# Patient Record
Sex: FEMALE | Employment: FULL TIME | URBAN - METROPOLITAN AREA
[De-identification: names, ages, dates, MRNs, and addresses within clinical notes are randomized per-mention and may not be internally consistent; named-entity substitution may affect disease eponyms.]

---

## 2023-03-11 ENCOUNTER — HOSPITAL ENCOUNTER (EMERGENCY)
Age: 18
Discharge: HOME | End: 2023-03-11
Attending: STUDENT IN AN ORGANIZED HEALTH CARE EDUCATION/TRAINING PROGRAM
Payer: COMMERCIAL

## 2023-03-11 VITALS
RESPIRATION RATE: 16 BRPM | HEIGHT: 63 IN | SYSTOLIC BLOOD PRESSURE: 133 MMHG | DIASTOLIC BLOOD PRESSURE: 78 MMHG | OXYGEN SATURATION: 99 % | TEMPERATURE: 98.7 F | BODY MASS INDEX: 22.86 KG/M2 | HEART RATE: 88 BPM | WEIGHT: 129 LBS

## 2023-03-11 DIAGNOSIS — R09.89 GLOBUS SENSATION: ICD-10-CM

## 2023-03-11 DIAGNOSIS — R59.0 LYMPHADENOPATHY OF RIGHT CERVICAL REGION: Primary | ICD-10-CM

## 2023-03-11 DIAGNOSIS — G93.31 POSTVIRAL FATIGUE SYNDROME: ICD-10-CM

## 2023-03-11 LAB
ANION GAP SERPL CALC-SCNC: 4 MMOL/L (ref 5–15)
BASOPHILS # BLD: 0.1 K/UL (ref 0–0.1)
BASOPHILS NFR BLD: 1 % (ref 0–1)
BUN SERPL-MCNC: 11 MG/DL (ref 6–20)
BUN/CREAT SERPL: 13 (ref 12–20)
CALCIUM SERPL-MCNC: 9.3 MG/DL (ref 8.5–10.1)
CHLORIDE SERPL-SCNC: 107 MMOL/L (ref 97–108)
CO2 SERPL-SCNC: 26 MMOL/L (ref 21–32)
CREAT SERPL-MCNC: 0.84 MG/DL (ref 0.55–1.02)
DIFFERENTIAL METHOD BLD: NORMAL
EOSINOPHIL # BLD: 0.2 K/UL (ref 0–0.4)
EOSINOPHIL NFR BLD: 4 % (ref 0–7)
ERYTHROCYTE [DISTWIDTH] IN BLOOD BY AUTOMATED COUNT: 11.9 % (ref 11.5–14.5)
GLUCOSE SERPL-MCNC: 113 MG/DL (ref 65–100)
HCT VFR BLD AUTO: 40.2 % (ref 35–47)
HETEROPH AB BLD QL IA: NEGATIVE
HGB BLD-MCNC: 13.8 G/DL (ref 11.5–16)
IMM GRANULOCYTES # BLD AUTO: 0 K/UL (ref 0–0.04)
IMM GRANULOCYTES NFR BLD AUTO: 0 % (ref 0–0.5)
LYMPHOCYTES # BLD: 1.5 K/UL (ref 0.8–3.5)
LYMPHOCYTES NFR BLD: 35 % (ref 12–49)
MCH RBC QN AUTO: 30.3 PG (ref 26–34)
MCHC RBC AUTO-ENTMCNC: 34.3 G/DL (ref 30–36.5)
MCV RBC AUTO: 88.4 FL (ref 80–99)
MONOCYTES # BLD: 0.2 K/UL (ref 0–1)
MONOCYTES NFR BLD: 6 % (ref 5–13)
NEUTS SEG # BLD: 2.3 K/UL (ref 1.8–8)
NEUTS SEG NFR BLD: 54 % (ref 32–75)
NRBC # BLD: 0 K/UL (ref 0–0.01)
NRBC BLD-RTO: 0 PER 100 WBC
PLATELET # BLD AUTO: 293 K/UL (ref 150–400)
PMV BLD AUTO: 9.5 FL (ref 8.9–12.9)
POTASSIUM SERPL-SCNC: 4 MMOL/L (ref 3.5–5.1)
RBC # BLD AUTO: 4.55 M/UL (ref 3.8–5.2)
SODIUM SERPL-SCNC: 137 MMOL/L (ref 136–145)
WBC # BLD AUTO: 4.2 K/UL (ref 3.6–11)

## 2023-03-11 PROCEDURE — 99283 EMERGENCY DEPT VISIT LOW MDM: CPT

## 2023-03-11 PROCEDURE — 85025 COMPLETE CBC W/AUTO DIFF WBC: CPT

## 2023-03-11 PROCEDURE — 86308 HETEROPHILE ANTIBODY SCREEN: CPT

## 2023-03-11 PROCEDURE — 36415 COLL VENOUS BLD VENIPUNCTURE: CPT

## 2023-03-11 PROCEDURE — 80048 BASIC METABOLIC PNL TOTAL CA: CPT

## 2023-03-11 NOTE — ED PROVIDER NOTES
Patient is a healthy 12-year-old female who a month ago patient had swelling on right neck that was diagnosed as a reactive lymph node. She was diagnosed with viral pharyngitis at that time. Since that time patient has been is very tired. On evaluation urgent care 3 to 4 weeks ago strep and Mono spot was negative. Patient denies throat, but has globus sensation. No fevers, chills or night sweats. Specifically no B symptoms. No past medical history on file. No past surgical history on file. No family history on file. Social History     Socioeconomic History    Marital status: SINGLE     Spouse name: Not on file    Number of children: Not on file    Years of education: Not on file    Highest education level: Not on file   Occupational History    Not on file   Tobacco Use    Smoking status: Not on file    Smokeless tobacco: Not on file   Substance and Sexual Activity    Alcohol use: Not on file    Drug use: Not on file    Sexual activity: Not on file   Other Topics Concern    Not on file   Social History Narrative    Not on file     Social Determinants of Health     Financial Resource Strain: Not on file   Food Insecurity: Not on file   Transportation Needs: Not on file   Physical Activity: Not on file   Stress: Not on file   Social Connections: Not on file   Intimate Partner Violence: Not on file   Housing Stability: Not on file         ALLERGIES: Patient has no allergy information on record. Review of Systems   Constitutional:  Positive for activity change and fatigue. Negative for appetite change and fever. HENT:  Positive for sore throat. Respiratory:  Negative for cough and shortness of breath. Cardiovascular:  Negative for chest pain. There were no vitals filed for this visit. Physical Exam  Vitals and nursing note reviewed. Constitutional:       General: She is not in acute distress. Appearance: Normal appearance.    HENT:      Head: Normocephalic and atraumatic. Right Ear: External ear normal.      Left Ear: External ear normal.      Nose: Nose normal.      Mouth/Throat:      Mouth: Mucous membranes are moist.      Pharynx: No oropharyngeal exudate or posterior oropharyngeal erythema. Eyes:      Extraocular Movements: Extraocular movements intact. Conjunctiva/sclera: Conjunctivae normal.   Neck:      Comments: Possible submandibular lymph node on the right, however no other lymphadenopathy in the neck, no erythema or fluctuance. Cardiovascular:      Rate and Rhythm: Normal rate. Pulses: Normal pulses. Radial pulses are 2+ on the right side and 2+ on the left side. Heart sounds: Normal heart sounds. Pulmonary:      Effort: Pulmonary effort is normal.      Breath sounds: Normal breath sounds. Abdominal:      General: Abdomen is flat. There is no distension. Tenderness: There is no abdominal tenderness. Musculoskeletal:         General: No deformity or signs of injury. Normal range of motion. Cervical back: Normal range of motion and neck supple. No tenderness. Skin:     General: Skin is warm and dry. Capillary Refill: Capillary refill takes less than 2 seconds. Neurological:      General: No focal deficit present. Mental Status: She is alert and oriented to person, place, and time. Psychiatric:         Attention and Perception: Attention normal.         Mood and Affect: Mood normal.         Behavior: Behavior normal.        Medical Decision Making  Amount and/or Complexity of Data Reviewed  Independent Historian: parent  Labs: ordered. Decision-making details documented in ED Course.       ED Course as of 03/12/23 1217   Sat Mar 11, 2023   1830 WBC: 4.2 [AL]   1830 HGB: 13.8 [AL]   1851 Mononucleosis screen: Negative [AL]      ED Course User Index  [AL] Eugenie Blair MD     LABORATORY RESULTS:  Labs Reviewed   METABOLIC PANEL, BASIC - Abnormal; Notable for the following components:       Result Value    Anion gap 4 (*)     Glucose 113 (*)     All other components within normal limits   CBC WITH AUTOMATED DIFF   MONONUCLEOSIS SCREEN       MEDICATIONS GIVEN:  Medications - No data to display    Differential diagnosis: Globus sensation, lymphadenopathy, mononucleosis, other viral process, postviral fatigue syndrome    ED physician interpretation of laboratory results: Lab work unremarkable. CBC within normal limits. Monospot negative. Mild hyperglycemia without DKA. MDM: Patient is a 20-year-old female presented ED with about 1 month of globus sensation and possible right reactive lymph node. Physical exam is unremarkable. Patient is in no acute distress without fevers or B symptoms. Patient is well-appearing. No indication for advanced imaging at this time. Further outpatient symptomatic management appropriate. Patient has follow-up appointment with PCP at the end of April. Additional information for PCP establishment provided. Further personalized recommendations for outpatient care as below. Key discharge instructions and summary of care: You presented to the ED with a swollen right lymph node/bump in your neck for about 3 to 4 weeks. Work-up here was unremarkable. Your CBC your complete blood count was completely normal.  Your Monospot was negative. Your basic metabolic panel had no concerning findings. You have concern for lymphoma after recent Internet research. Based on your current exam and lab work this is unlikely I recommend outpatient follow-up with PCP. If you do not have a primary care physician use information or discharge paperwork to establish primary care. If symptoms change or worsen you start having night fevers, night sweats and worsening swelling to neck return to ED for further evaluation. The patient has been re-evaluated and feeling better. Patient is stable for discharge.   All available radiology and laboratory results have been reviewed with patient and/or available family. Patient and/or family verbally conveyed their understanding and agreement of the patient's signs, symptoms, diagnosis, treatment and prognosis and additionally agree to follow-up as recommended in the discharge instructions or to return to the Emergency Department should their condition change or worsen prior to their follow-up appointment. All questions have been answered and patient and/or available family express understanding. IMPRESSION:  1. Lymphadenopathy of right cervical region    2. Postviral fatigue syndrome    3. Globus sensation        DISPOSITION: Discharged    Juanpablo Banda MD      Procedures

## 2023-03-11 NOTE — ED TRIAGE NOTES
Pt reports she noticed right lymph swollen one month ago. Was evaluated and dx with viral tonsillitis. Denies sore throat. Reports increased general malaise.

## 2023-03-11 NOTE — DISCHARGE INSTRUCTIONS
You presented to the ED with a swollen right lymph node/bump in your neck for about 3 to 4 weeks. Work-up here was unremarkable. Your CBC your complete blood count was completely normal.  Your Monospot was negative. Your basic metabolic panel had no concerning findings. You have concern for lymphoma after recent Internet research. Based on your current exam and lab work this is unlikely I recommend outpatient follow-up with PCP. If you do not have a primary care physician use information or discharge paperwork to establish primary care. If symptoms change or worsen you start having night fevers, night sweats and worsening swelling to neck return to ED for further evaluation.

## 2023-04-07 ENCOUNTER — OFFICE VISIT (OUTPATIENT)
Dept: ENT CLINIC | Age: 18
End: 2023-04-07

## 2023-04-14 ENCOUNTER — HOSPITAL ENCOUNTER (OUTPATIENT)
Dept: ULTRASOUND IMAGING | Age: 18
Discharge: HOME | End: 2023-04-14
Attending: OTOLARYNGOLOGY
Payer: COMMERCIAL

## 2023-04-14 DIAGNOSIS — R59.1 LYMPHADENOPATHY: ICD-10-CM

## 2023-04-14 PROCEDURE — 76536 US EXAM OF HEAD AND NECK: CPT

## 2023-04-18 ENCOUNTER — TELEPHONE (OUTPATIENT)
Dept: ENT CLINIC | Age: 18
End: 2023-04-18

## 2023-04-18 NOTE — TELEPHONE ENCOUNTER
Called and spoke with pt re: Yonathan Frausto results    Will plan follow up in 2-3 months -  recheck neck     MD Selene Apple [address] [facility]  [facility] [address] [facility]  Office Phone: [phone]

## 2023-04-27 ENCOUNTER — VIRTUAL VISIT (OUTPATIENT)
Dept: PRIMARY CARE CLINIC | Age: 18
End: 2023-04-27
Payer: COMMERCIAL

## 2023-04-27 DIAGNOSIS — R59.9 ENLARGED LYMPH NODES: ICD-10-CM

## 2023-04-27 DIAGNOSIS — Z76.89 ENCOUNTER TO ESTABLISH CARE: Primary | ICD-10-CM

## 2023-04-27 PROCEDURE — 99213 OFFICE O/P EST LOW 20 MIN: CPT | Performed by: NURSE PRACTITIONER

## 2023-04-27 NOTE — PROGRESS NOTES
Visit Vitals  [facility] 04/16/2023     Chief Complaint   Patient presents with    Establish Care       1. \"Have you been to the ER, urgent care clinic since your last visit? Hospitalized since your last visit? \" No    2. \"Have you seen or consulted any other health care providers outside of the [address] [place] [address] since your last visit? \" No     3. For patients aged 39-70: Has the patient had a colonoscopy / FIT/ Cologuard? NA - based on age      If the patient is female:    4. For patients aged 41-77: Has the patient had a mammogram within the past 2 years? NA - based on age or sex      11. For patients aged 21-65: Has the patient had a pap smear?  NA - based on age or sex

## 2023-04-27 NOTE — PROGRESS NOTES
HISTORY OF PRESENT ILLNESS  Mona Cruz is a 21 y.o. female presents via telemedicine to establish care. Patient states in Feb she had swollen lymph nodes in her neck, [facility] normal. Symptoms persisted so she went to ER and was referred to ENT. ENT did an ultrasound and she was told the lymph nodes were benign. Patient states since then, the lymph nodes in her right groin started to swell up x 2 weeks. She denies fevers. She does have pain when she palpates them and pain in her leg. She denies concern for STDs, states she is not sexually active. She denies recent illness. No night sweats or weight loss. Denies any other symptoms. There were no vitals filed for this visit. There is no problem list on file for this patient. There are no problems to display for this patient. Current Outpatient Medications   Medication Sig Dispense Refill    Junel FE 1.5/30, 28, 1.5 mg-30 mcg (21)/75 mg (7) tab Take 1 Tablet by mouth daily. Allergies   Allergen Reactions    Azithromycin Rash     Past Medical History:   Diagnosis Date    Contact dermatitis and eczema due to cause      History reviewed. No pertinent surgical history. Family History   Problem Relation Age of Onset    Migraines Mother     Cancer Maternal Grandmother     Cancer Maternal Grandfather     Cancer Maternal Aunt     Cancer Maternal Aunt      Social History     Tobacco Use    Smoking status: Never    Smokeless tobacco: Never   Substance Use Topics    Alcohol use: Never           Review of Systems   Constitutional: Negative. Respiratory: Negative. Cardiovascular: Negative. Genitourinary: Negative. Musculoskeletal: Negative. Neurological: Negative. Physical Exam  Constitutional:       General: She is not in acute distress. Appearance: Normal appearance. HENT:      Head: Normocephalic.    Pulmonary:      Effort: Pulmonary effort is normal.   Neurological:      Mental Status: She is alert and oriented to person, place, and time. Mental status is at baseline. Psychiatric:         Mood and Affect: Mood normal.         Behavior: Behavior normal.         ASSESSMENT and PLAN  Diagnoses and all orders for this visit:    1. Encounter to establish care    2. Enlarged lymph nodes  Comments:  to neck and now right groin, no associated symptoms. Will check labs and consider ultrasound pending lab results. Orders:  -     CBC WITH AUTOMATED DIFF         Sonali Moyer, who was evaluated through a synchronous (real-time) audio-video encounter, and/or her healthcare decision maker, is aware that it is a billable service, with coverage as determined by her insurance carrier. She provided verbal consent to proceed: Yes, and patient identification was verified. It was conducted pursuant to the emergency declaration under the [address] [place] [address] and the [employer] Act. A caregiver was present when appropriate. Ability to conduct physical exam was limited. I was at home. The patient was at home. Sonali Moyer is a 21 y.o. female being evaluated by a Virtual Visit (video visit) encounter to address concerns as mentioned above. A caregiver was present when appropriate. Due to this being a TeleHealth encounter (During YRIS-21 public health emergency), evaluation of the following organ systems was limited: Vitals/Constitutional/EENT/Resp/CV/GI//MS/Neuro/Skin/Heme-Lymph-Imm. Pursuant to the emergency declaration under the [address] [place] [address], [address] [place] [address] and the [employer] and [employer] Act, this Virtual Visit was conducted with patient's (and/or legal guardian's) consent, to reduce the risk of exposure to COVID-19 and provide necessary medical care.       Services were provided through a video synchronous discussion virtually to substitute for in-person encounter. --BENTON Contreras on 4/27/2023 at 10:34 AM    An electronic signature was used to authenticate this note.

## 2023-04-29 LAB
BASOPHILS # BLD AUTO: 0.1 X10E3/UL (ref 0–0.2)
BASOPHILS NFR BLD AUTO: 1 %
EOSINOPHIL # BLD AUTO: 0.2 X10E3/UL (ref 0–0.4)
EOSINOPHIL NFR BLD AUTO: 3 %
ERYTHROCYTE [DISTWIDTH] IN BLOOD BY AUTOMATED COUNT: 12 % (ref 11.7–15.4)
HCT VFR BLD AUTO: 40.5 % (ref 34–46.6)
HGB BLD-MCNC: 13.7 G/DL (ref 11.1–15.9)
IMM GRANULOCYTES # BLD AUTO: 0 X10E3/UL (ref 0–0.1)
IMM GRANULOCYTES NFR BLD AUTO: 0 %
LYMPHOCYTES # BLD AUTO: 2.4 X10E3/UL (ref 0.7–3.1)
LYMPHOCYTES NFR BLD AUTO: 39 %
MCH RBC QN AUTO: 30.2 PG (ref 26.6–33)
MCHC RBC AUTO-ENTMCNC: 33.8 G/DL (ref 31.5–35.7)
MCV RBC AUTO: 89 FL (ref 79–97)
MONOCYTES # BLD AUTO: 0.5 X10E3/UL (ref 0.1–0.9)
MONOCYTES NFR BLD AUTO: 7 %
NEUTROPHILS # BLD AUTO: 3.1 X10E3/UL (ref 1.4–7)
NEUTROPHILS NFR BLD AUTO: 50 %
PLATELET # BLD AUTO: 298 X10E3/UL (ref 150–450)
RBC # BLD AUTO: 4.54 X10E6/UL (ref 3.77–5.28)
WBC # BLD AUTO: 6.2 X10E3/UL (ref 3.4–10.8)

## 2023-06-19 ENCOUNTER — TELEPHONE (OUTPATIENT)
Age: 18
End: 2023-06-19

## 2023-06-19 NOTE — TELEPHONE ENCOUNTER
Attempted to reach pt to inform her that Dr. Nicholas Addison had to add an emergency surgery on Friday and that it will run into her current appointment time with Dr. Nicholas Addison and that we need to reschedule her appt to a later time the same day or a different day all together. Unable to leave voicemail due to the b being full.

## 2023-06-21 ENCOUNTER — TELEPHONE (OUTPATIENT)
Age: 18
End: 2023-06-21

## 2023-06-23 ENCOUNTER — HOSPITAL ENCOUNTER (OUTPATIENT)
Dept: VASCULAR SURGERY | Facility: HOSPITAL | Age: 18
Discharge: HOME | End: 2023-06-23
Payer: COMMERCIAL

## 2023-06-23 DIAGNOSIS — R59.9 ENLARGED LYMPH NODES, UNSPECIFIED: ICD-10-CM

## 2023-06-23 PROCEDURE — 93971 EXTREMITY STUDY: CPT

## 2023-06-30 ENCOUNTER — OFFICE VISIT (OUTPATIENT)
Age: 18
End: 2023-06-30
Payer: COMMERCIAL

## 2023-06-30 VITALS
BODY MASS INDEX: 23.39 KG/M2 | HEIGHT: 63 IN | DIASTOLIC BLOOD PRESSURE: 84 MMHG | OXYGEN SATURATION: 99 % | SYSTOLIC BLOOD PRESSURE: 136 MMHG | WEIGHT: 132 LBS | HEART RATE: 107 BPM

## 2023-06-30 DIAGNOSIS — R59.0 LOCALIZED ENLARGED LYMPH NODES: Primary | ICD-10-CM

## 2023-06-30 DIAGNOSIS — J02.9 SORE THROAT: ICD-10-CM

## 2023-06-30 PROCEDURE — 99213 OFFICE O/P EST LOW 20 MIN: CPT | Performed by: OTOLARYNGOLOGY

## 2023-06-30 RX ORDER — AMOXICILLIN AND CLAVULANATE POTASSIUM 875; 125 MG/1; MG/1
1 TABLET, FILM COATED ORAL 2 TIMES DAILY
Qty: 20 TABLET | Refills: 0 | Status: SHIPPED | OUTPATIENT
Start: 2023-06-30 | End: 2023-07-10

## 2023-06-30 RX ORDER — NORETHINDRONE ACETATE AND ETHINYL ESTRADIOL AND FERROUS FUMARATE 1.5-30(21)
1 KIT ORAL DAILY
COMMUNITY
Start: 2023-04-30

## 2023-07-18 DIAGNOSIS — R59.0 LOCALIZED ENLARGED LYMPH NODES: Primary | ICD-10-CM

## 2023-07-31 ENCOUNTER — HOSPITAL ENCOUNTER (OUTPATIENT)
Facility: HOSPITAL | Age: 18
Discharge: HOME | End: 2023-08-03
Attending: OTOLARYNGOLOGY
Payer: COMMERCIAL

## 2023-07-31 DIAGNOSIS — R59.0 LOCALIZED ENLARGED LYMPH NODES: ICD-10-CM

## 2023-07-31 PROCEDURE — 76536 US EXAM OF HEAD AND NECK: CPT

## 2023-10-20 ENCOUNTER — OFFICE VISIT (OUTPATIENT)
Age: 18
End: 2023-10-20
Payer: COMMERCIAL

## 2023-10-20 VITALS
WEIGHT: 132 LBS | BODY MASS INDEX: 23.39 KG/M2 | RESPIRATION RATE: 18 BRPM | OXYGEN SATURATION: 98 % | HEIGHT: 63 IN | DIASTOLIC BLOOD PRESSURE: 58 MMHG | HEART RATE: 97 BPM | SYSTOLIC BLOOD PRESSURE: 116 MMHG

## 2023-10-20 DIAGNOSIS — R59.0 LOCALIZED ENLARGED LYMPH NODES: Primary | ICD-10-CM

## 2023-10-20 DIAGNOSIS — J35.8 TONSIL STONE: ICD-10-CM

## 2023-10-20 DIAGNOSIS — J30.9 ALLERGIC RHINITIS, UNSPECIFIED SEASONALITY, UNSPECIFIED TRIGGER: ICD-10-CM

## 2023-10-20 PROCEDURE — 99213 OFFICE O/P EST LOW 20 MIN: CPT | Performed by: OTOLARYNGOLOGY

## 2023-10-20 RX ORDER — CETIRIZINE HYDROCHLORIDE 10 MG/1
10 TABLET ORAL DAILY
Qty: 90 TABLET | Refills: 1 | Status: SHIPPED | OUTPATIENT
Start: 2023-10-20

## 2023-10-20 RX ORDER — FLUTICASONE PROPIONATE 50 MCG
1 SPRAY, SUSPENSION (ML) NASAL DAILY
Qty: 16 G | Refills: 2 | Status: SHIPPED | OUTPATIENT
Start: 2023-10-20

## 2023-10-20 NOTE — PROGRESS NOTES
Otolaryngology-Head and Neck Surgery  Follow Up Patient Visit     Patient: Pam Jean  YOB: 2005  MRN: [id number]  Date of Service: 10/20/2023      Chief complaint:   No chief complaint on file. Interval hx: 10/20/2023  Doing well  Increased tonsil stones which are bothersome   Feels hard to clear on the right     Interval hx 6/2023  No change overall  Still feels nodes are swollen but no worse  Still feels R tonsil larger     History of Present Illness: Pam Jean is a 21y.o. year old female who presents today for discussion of neck and throat concerns    Over the last couple of months has developed neck adenopathy, R sided and mild sore throat  Was seen at urgent care and dx with viral pharyngitis  With continued symptoms, seen in ER - had blood work etc which was benign     Overall symptoms continue  She feels her right tonsil may be more swollen    Hx of tonsillitis as a child, no issues as adult    Otherwise healthy     Past Medical History:  No past medical history on file. Past Surgical History:   No past surgical history on file. Medications:   No current outpatient medications    Allergies:   No Known Allergies    Social History:         Family History:  No family history on file. Review of Systems:    Consitutional: denies fever, excessive weight gain or loss. Eyes: denies diplopia, eye pain. Integumentary: denies new concerning skin lesions. Ears, Nose, Mouth, Throat: denies except as per HPI. Endocrine: denies hot or cold intolerance, increased thirst.  Respiratory: denies cough, hemoptysis, wheezing  Gastrointestinal: denies trouble swallowing, nausea, emesis, regurgitation  Musculoskeletal: denies muscle weakness or wasting  Cardiovascular: denies chest pain, shortness of breath  Neurologic: denies seizures, numbness or tingling, syncope  Hematologic: denies easy bleeding or bruising    Physical Examination:   There were no vitals filed for this visit.      General:

## 2024-01-10 RX ORDER — FLUTICASONE PROPIONATE 50 MCG
SPRAY, SUSPENSION (ML) NASAL
Qty: 1 EACH | Refills: 1 | Status: SHIPPED | OUTPATIENT
Start: 2024-01-10

## 2024-03-07 RX ORDER — FLUTICASONE PROPIONATE 50 MCG
SPRAY, SUSPENSION (ML) NASAL
Qty: 1 EACH | Refills: 1 | Status: SHIPPED | OUTPATIENT
Start: 2024-03-07

## 2024-03-08 RX ORDER — FLUTICASONE PROPIONATE 50 MCG
SPRAY, SUSPENSION (ML) NASAL
Refills: 1 | OUTPATIENT
Start: 2024-03-08

## 2024-04-23 RX ORDER — CETIRIZINE HYDROCHLORIDE 10 MG/1
10 TABLET ORAL DAILY
Qty: 90 TABLET | Refills: 1 | Status: SHIPPED | OUTPATIENT
Start: 2024-04-23

## 2024-08-21 ENCOUNTER — HOSPITAL ENCOUNTER (EMERGENCY)
Facility: HOSPITAL | Age: 19
Discharge: HOME | End: 2024-08-22
Attending: EMERGENCY MEDICINE
Payer: COMMERCIAL
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
## 2024-08-21 DIAGNOSIS — Z23 NEED FOR PROPHYLACTIC VACCINATION AND INOCULATION AGAINST RABIES: ICD-10-CM

## 2024-08-21 PROCEDURE — 99284 EMERGENCY DEPT VISIT MOD MDM: CPT

## 2024-08-22 VITALS
TEMPERATURE: 98.4 F | OXYGEN SATURATION: 100 % | SYSTOLIC BLOOD PRESSURE: 118 MMHG | RESPIRATION RATE: 17 BRPM | HEART RATE: 74 BPM | WEIGHT: 134 LBS | DIASTOLIC BLOOD PRESSURE: 74 MMHG | HEIGHT: 63 IN | BODY MASS INDEX: 23.74 KG/M2

## 2024-08-22 PROBLEM — Z20.3 RABIES EXPOSURE: Status: ACTIVE | Noted: 2024-08-22

## 2024-08-22 PROCEDURE — 90471 IMMUNIZATION ADMIN: CPT | Performed by: EMERGENCY MEDICINE

## 2024-08-22 PROCEDURE — 96372 THER/PROPH/DIAG INJ SC/IM: CPT

## 2024-08-22 PROCEDURE — 90375 RABIES IG IM/SC: CPT | Performed by: EMERGENCY MEDICINE

## 2024-08-22 PROCEDURE — 90472 IMMUNIZATION ADMIN EACH ADD: CPT | Performed by: EMERGENCY MEDICINE

## 2024-08-22 PROCEDURE — 90714 TD VACC NO PRESV 7 YRS+ IM: CPT | Performed by: EMERGENCY MEDICINE

## 2024-08-22 PROCEDURE — 6360000002 HC RX W HCPCS: Performed by: EMERGENCY MEDICINE

## 2024-08-22 PROCEDURE — 90675 RABIES VACCINE IM: CPT | Performed by: EMERGENCY MEDICINE

## 2024-08-22 RX ADMIN — CLOSTRIDIUM TETANI TOXOID ANTIGEN (FORMALDEHYDE INACTIVATED) AND CORYNEBACTERIUM DIPHTHERIAE TOXOID ANTIGEN (FORMALDEHYDE INACTIVATED) 0.5 ML: 5; 2 INJECTION, SUSPENSION INTRAMUSCULAR at 00:40

## 2024-08-22 RX ADMIN — RABIES IMMUNE GLOBULIN (HUMAN) 1215 UNITS: 300 INJECTION, SOLUTION INFILTRATION; INTRAMUSCULAR at 00:36

## 2024-08-22 RX ADMIN — RABIES VACCINE 1 ML: KIT at 00:36

## 2024-08-22 ASSESSMENT — PAIN - FUNCTIONAL ASSESSMENT: PAIN_FUNCTIONAL_ASSESSMENT: NONE - DENIES PAIN

## 2024-08-22 NOTE — ED TRIAGE NOTES
Pt ambulatory to triage with CC of a cat bite on her left thumb.     Pt came to get checked out due to the bite drawing blood.     This RN sees no swelling, redness or drainage.

## 2024-08-22 NOTE — CARE COORDINATION
1:37 PM  CM received a call back from the patient, provided appointment information- address/time/phone number.     9:45 AM    CM spoke to [facility] and they set up the first appointment on 8/25/24 at 9:00AM. CM added to the AVS and will provide date/time when patient returns the call.     8/22/24  9:36 AM    ED CASE MANAGEMENT NOTE:    CM noted rabies scripts as patient is in need of the rabies series. CM noted the first appointment will be on Saturday, CM faxed scripts to [facility] as they are the only infusion center open on the weekends. CM called patients charted phone number and left a HIPAA compliant VM-awaiting return call.    CM to follow up with [facility] once the fax is received.    NIDHI Murray  [facility]      Available via [employer]

## 2024-08-23 NOTE — ED PROVIDER NOTES
[facility] EMERGENCY DEPT  EMERGENCY DEPARTMENT ENCOUNTER      Pt Name: Chantelle Fernando  MRN: [id number]  Birthdate 2005  Date of evaluation: 8/21/2024  Provider: Julian Wallis MD    CHIEF COMPLAINT       Chief Complaint   Patient presents with    Animal Bite         HISTORY OF PRESENT ILLNESS   (Location/Symptom, Timing/Onset, Context/Setting, Quality, Duration, Modifying Factors, Severity)  Note limiting factors.   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  The patient deup-to-date.  Patient denies any further discomfort at this time.            Review of External Medical Records:     Nursing Notes were reviewed.    REVIEW OF SYSTEMS    (2-9 systems for level 4, 10 or more for level 5)     Review of Systems   All other systems reviewed and are negative.      Except as noted above the remainder of the review of systems was reviewed and negative.       PAST MEDICAL HISTORY     Past Medical History:   Diagnosis Date    Contact dermatitis and eczema due to cause          SURGICAL HISTORY     No past surgical history on file.      CURRENT MEDICATIONS       Discharge Medication List as of 8/22/2024 12:28 AM        CONTINUE these medications which have NOT CHANGED    Details   cetirizine (ZYRTEC) 10 MG tablet TAKE 1 TABLET BY MOUTH EVERY DAY, Disp-90 tablet, R-1Normal      fluticasone (FLONASE) 50 MCG/ACT nasal spray SPRAY 1 SPRAY INTO EACH NOSTRIL EVERY DAY, Disp-1 each, R-1Normal      JUNEL FE 1.5/30 1.5-30 MG-MCG tablet Take 1 tablet by mouth daily, DAWHistorical Med             ALLERGIES     Azithromycin    FAMILY HISTORY       Family History   Problem Relation Age of Onset    Cancer Maternal Aunt         Cancer    Migraines Mother     Cancer Maternal Grandmother     Cancer Maternal Grandfather     Cancer Maternal Aunt         Cancer          SOCIAL HISTORY       Social History     Socioeconomic History    Marital status:  outlined and questions have been answered. Pt is ready to go home. Will send home on cat bite of left thumb as well as rabies vaccination and. Outpatient referral with PCP as needed. Written by Julian Wallis MD,6:22 AM       CONSULTS:  None    PROCEDURES:  Unless otherwise noted below, none     Procedures      FINAL IMPRESSION      1. Cat bite, initial encounter    2. Need for prophylactic vaccination and inoculation against rabies          DISPOSITION/PLAN   DISPOSITION Decision To Discharge 08/22/2024 12:19:32 AM      PATIENT REFERRED TO:  [address] [address] [phone]  Go on 8/25/2024  at 9:00 AM for day 3 rabies shot, they will schedule day 7 and 14 with you when you go to your first appointment.      DISCHARGE MEDICATIONS:  Discharge Medication List as of 8/22/2024 12:28 AM        START taking these medications    Details   !! rabies vaccine, PCEC (RABAVERT) injection Inject 1 mL into the muscle See Admin Instructions To be administered by a pharmacist, health department or outpatient department in 7 days., Disp-1 each, R-0Print      !! rabies vaccine, PCEC (RABAVERT) injection Inject 1 mL into the muscle See Admin Instructions To be administered by a pharmacist, health department or outpatient department in 14 days., Disp-1 each, R-0Print      !! rabies vaccine, PCEC (RABAVERT) injection Inject 1 mL into the muscle See Admin Instructions To be administered by a pharmacist, health department or outpatient department in 3 days., Disp-1 each, R-0Print      amoxicillin-clavulanate (AUGMENTIN) 875-125 MG per tablet Take 1 tablet by mouth 2 times daily for 10 days, Disp-14 tablet, R-0Print       !! - Potential duplicate medications found. Please discuss with provider.            (Please note that portions of this note were completed with a voice recognition program.  Efforts were made to edit the dictations but occasionally words are mis-transcribed.)    Julian

## 2024-08-25 ENCOUNTER — HOSPITAL ENCOUNTER (OUTPATIENT)
Facility: HOSPITAL | Age: 19
Setting detail: INFUSION SERIES
Discharge: HOME | End: 2024-08-25
Payer: COMMERCIAL

## 2024-08-25 VITALS
DIASTOLIC BLOOD PRESSURE: 70 MMHG | TEMPERATURE: 98.8 F | RESPIRATION RATE: 16 BRPM | HEART RATE: 100 BPM | OXYGEN SATURATION: 100 % | SYSTOLIC BLOOD PRESSURE: 129 MMHG

## 2024-08-25 DIAGNOSIS — Z20.3 RABIES EXPOSURE: Primary | ICD-10-CM

## 2024-08-25 PROCEDURE — 6360000002 HC RX W HCPCS: Performed by: EMERGENCY MEDICINE

## 2024-08-25 PROCEDURE — 90675 RABIES VACCINE IM: CPT | Performed by: EMERGENCY MEDICINE

## 2024-08-25 PROCEDURE — 90471 IMMUNIZATION ADMIN: CPT | Performed by: EMERGENCY MEDICINE

## 2024-08-25 RX ADMIN — RABIES VACCINE 1 ML: KIT at 08:49

## 2024-08-25 NOTE — PROGRESS NOTES
[facility]fusion [facility] Progress Note        Date: 24    Name: Chantelle Fernando    MRN: [id number]         : 2005    MD: Julian Wallis MD       Ms. Fernando admitted to [facility] for dose 2 rabies vaccine ambulatory in stable condition. Assessment completed. No new concerns voiced.     Injection given in R. Deltoid.      ** Patient has received this medication in the past. Patient reports no previous reactions to the medication(s).        Vitals:    24 0845   BP: 129/70   Pulse: 100   Resp: 16   Temp: 98.8 °F (37.1 °C)   TempSrc: Oral   SpO2: 100%           Medications:  MEDICATIONS GIVEN:  Medications Administered         rabies vaccine, PCEC (RABAVERT) injection 1 mL Admin Date  2024 Action  Given Dose  1 mL Route  IntraMUSCular Documented By  Julissa Elkins, RN                Pt tolerated treatment well. D/c home ambulatory in no distress. Patient is to follow up to reschedule their next appointment.

## 2024-08-29 ENCOUNTER — HOSPITAL ENCOUNTER (OUTPATIENT)
Facility: HOSPITAL | Age: 19
Setting detail: INFUSION SERIES
Discharge: HOME | End: 2024-08-29
Payer: COMMERCIAL

## 2024-08-29 VITALS
RESPIRATION RATE: 16 BRPM | HEART RATE: 99 BPM | DIASTOLIC BLOOD PRESSURE: 77 MMHG | OXYGEN SATURATION: 100 % | SYSTOLIC BLOOD PRESSURE: 121 MMHG | TEMPERATURE: 99 F

## 2024-08-29 DIAGNOSIS — Z20.3 RABIES EXPOSURE: Primary | ICD-10-CM

## 2024-08-29 PROCEDURE — 6360000002 HC RX W HCPCS: Performed by: EMERGENCY MEDICINE

## 2024-08-29 PROCEDURE — 90471 IMMUNIZATION ADMIN: CPT | Performed by: EMERGENCY MEDICINE

## 2024-08-29 PROCEDURE — 90675 RABIES VACCINE IM: CPT | Performed by: EMERGENCY MEDICINE

## 2024-08-29 RX ADMIN — RABIES VACCINE 1 ML: KIT at 16:04

## 2024-08-29 ASSESSMENT — PAIN SCALES - GENERAL: PAINLEVEL_OUTOF10: 0

## 2024-08-29 NOTE — PROGRESS NOTES
[facility] Progress Note    Date: August 29, 2024    1600: Ms. Fernando Arrived ambulatory and in stable condition to the [facility] for  Rabies Vaccine.  Assessment was completed, no new complaints voiced. Criteria for treatment was met.    Ms. Fernando's vitals were reviewed.  Patient Vitals for the past 12 hrs:   Temp Pulse Resp BP SpO2   08/29/24 1600 99 °F (37.2 °C) 99 16 121/77 100 %     Medications:  Medications Administered         rabies vaccine, PCEC (RABAVERT) injection 1 mL Admin Date  08/29/2024 Action  Given Dose  1 mL Route  IntraMUSCular Documented By  Madelyn Snyder, WILL           Given in LANCE.    Patient tolerated treatment well. Patient was discharged in stable condition. Patient is aware of next scheduled [facility] appointment on September 5, 2024 at 1600.    Future Appointments   Date Time Provider Department Center   9/5/2024  4:00 [facility]     Madelyn Snyder RN  August 29, 2024

## 2024-08-30 ENCOUNTER — HOSPITAL ENCOUNTER (OUTPATIENT)
Facility: HOSPITAL | Age: 19
Setting detail: INFUSION SERIES
End: 2024-08-30
Payer: COMMERCIAL

## 2024-09-05 ENCOUNTER — HOSPITAL ENCOUNTER (OUTPATIENT)
Facility: HOSPITAL | Age: 19
Setting detail: INFUSION SERIES
Discharge: HOME | End: 2024-09-05
Payer: COMMERCIAL

## 2024-09-05 VITALS
SYSTOLIC BLOOD PRESSURE: 130 MMHG | HEART RATE: 114 BPM | RESPIRATION RATE: 16 BRPM | TEMPERATURE: 98.2 F | DIASTOLIC BLOOD PRESSURE: 86 MMHG

## 2024-09-05 DIAGNOSIS — Z20.3 RABIES EXPOSURE: Primary | ICD-10-CM

## 2024-09-05 PROCEDURE — 90471 IMMUNIZATION ADMIN: CPT | Performed by: EMERGENCY MEDICINE

## 2024-09-05 PROCEDURE — 6360000002 HC RX W HCPCS: Performed by: EMERGENCY MEDICINE

## 2024-09-05 PROCEDURE — 96372 THER/PROPH/DIAG INJ SC/IM: CPT

## 2024-09-05 PROCEDURE — 90675 RABIES VACCINE IM: CPT | Performed by: EMERGENCY MEDICINE

## 2024-09-05 RX ADMIN — RABIES VACCINE 1 ML: KIT at 16:03

## 2024-09-05 ASSESSMENT — PAIN SCALES - GENERAL: PAINLEVEL_OUTOF10: 0

## 2024-09-05 NOTE — PROGRESS NOTES
[facility] Progress Note    Date: 2024    Name: Chantelle Fernando    MRN: [id number]         : 2005    Ms. Fernando Arrived ambulatory and in no distress for rabies D14 Regimen.  Assessment was completed, no acute issues at this time, no new complaints voiced.          No data to display                 Ms. Fernando's vitals were reviewed.  Vitals:    24 1545   BP: 130/86   Pulse: (!) 114   Resp: 16   Temp: 98.2 °F (36.8 °C)       Medications:  Medications Administered         rabies vaccine, PCEC (RABAVERT) injection 1 mL Admin Date  2024 Action  Given Dose  1 mL Route  IntraMUSCular Documented By  Bronwyn Chaudhary, WILL              Ms. Fernando tolerated treatment well and was discharged from [facility] in stable condition.   BRONWYN CHAUDHARY RN  2024

## 2024-09-06 ENCOUNTER — APPOINTMENT (OUTPATIENT)
Facility: HOSPITAL | Age: 19
End: 2024-09-06
Payer: COMMERCIAL

## 2024-10-21 RX ORDER — CETIRIZINE HYDROCHLORIDE 10 MG/1
10 TABLET ORAL DAILY
Qty: 90 TABLET | Refills: 1 | OUTPATIENT
Start: 2024-10-21

## 2025-03-11 SDOH — ECONOMIC STABILITY: INCOME INSECURITY: IN THE LAST 12 MONTHS, WAS THERE A TIME WHEN YOU WERE NOT ABLE TO PAY THE MORTGAGE OR RENT ON TIME?: NO

## 2025-03-11 SDOH — ECONOMIC STABILITY: FOOD INSECURITY: WITHIN THE PAST 12 MONTHS, YOU WORRIED THAT YOUR FOOD WOULD RUN OUT BEFORE YOU GOT MONEY TO BUY MORE.: NEVER TRUE

## 2025-03-11 SDOH — ECONOMIC STABILITY: TRANSPORTATION INSECURITY
IN THE PAST 12 MONTHS, HAS THE LACK OF TRANSPORTATION KEPT YOU FROM MEDICAL APPOINTMENTS OR FROM GETTING MEDICATIONS?: NO

## 2025-03-11 SDOH — ECONOMIC STABILITY: FOOD INSECURITY: WITHIN THE PAST 12 MONTHS, THE FOOD YOU BOUGHT JUST DIDN'T LAST AND YOU DIDN'T HAVE MONEY TO GET MORE.: NEVER TRUE

## 2025-03-11 SDOH — ECONOMIC STABILITY: TRANSPORTATION INSECURITY
IN THE PAST 12 MONTHS, HAS LACK OF TRANSPORTATION KEPT YOU FROM MEETINGS, WORK, OR FROM GETTING THINGS NEEDED FOR DAILY LIVING?: NO

## 2025-03-12 ENCOUNTER — OFFICE VISIT (OUTPATIENT)
Dept: PRIMARY CARE CLINIC | Facility: CLINIC | Age: 20
End: 2025-03-12
Payer: COMMERCIAL

## 2025-03-12 VITALS
BODY MASS INDEX: 23.92 KG/M2 | OXYGEN SATURATION: 98 % | TEMPERATURE: 98 F | DIASTOLIC BLOOD PRESSURE: 92 MMHG | HEART RATE: 102 BPM | SYSTOLIC BLOOD PRESSURE: 131 MMHG | HEIGHT: 63 IN | WEIGHT: 135 LBS

## 2025-03-12 DIAGNOSIS — R42 DIZZINESS: ICD-10-CM

## 2025-03-12 DIAGNOSIS — R59.9 ENLARGED LYMPH NODES, UNSPECIFIED: Primary | ICD-10-CM

## 2025-03-12 DIAGNOSIS — R21 RASH: ICD-10-CM

## 2025-03-12 DIAGNOSIS — F41.9 ANXIETY: ICD-10-CM

## 2025-03-12 DIAGNOSIS — R00.2 PALPITATIONS: ICD-10-CM

## 2025-03-12 PROCEDURE — 93000 ELECTROCARDIOGRAM COMPLETE: CPT | Performed by: NURSE PRACTITIONER

## 2025-03-12 PROCEDURE — 99214 OFFICE O/P EST MOD 30 MIN: CPT | Performed by: NURSE PRACTITIONER

## 2025-03-12 RX ORDER — ESCITALOPRAM OXALATE 5 MG/1
5 TABLET ORAL DAILY
Qty: 30 TABLET | Refills: 5 | Status: SHIPPED | OUTPATIENT
Start: 2025-03-12

## 2025-03-12 RX ORDER — TRIAMCINOLONE ACETONIDE 1 MG/G
OINTMENT TOPICAL
Qty: 80 G | Refills: 0 | Status: SHIPPED | OUTPATIENT
Start: 2025-03-12

## 2025-03-12 ASSESSMENT — ENCOUNTER SYMPTOMS: SHORTNESS OF BREATH: 0

## 2025-03-12 ASSESSMENT — PATIENT HEALTH QUESTIONNAIRE - PHQ9
SUM OF ALL RESPONSES TO PHQ QUESTIONS 1-9: 0
1. LITTLE INTEREST OR PLEASURE IN DOING THINGS: NOT AT ALL
2. FEELING DOWN, DEPRESSED OR HOPELESS: NOT AT ALL
SUM OF ALL RESPONSES TO PHQ QUESTIONS 1-9: 0

## 2025-03-12 NOTE — PROGRESS NOTES
Chantelle Fernando is a 19 y.o. female presents for    Chief Complaint   Patient presents with    Other     Patient would like to discuss possibly getting thyroid and POTS testing.      .    ASSESSMENT and PLAN  Chantelle was seen today for other.    Diagnoses and all orders for this visit:    Enlarged lymph nodes, unspecified  Comments:  will recheck US  Orders:  -     US HEAD NECK SOFT TISSUE    Dizziness  Comments:  Discussed slow position changes, wearing compression stockings, increase hydration, salt  Orders:  -     EKG 12 lead  -     Cortisol AM, Total  -     Iron and TIBC    Anxiety  Comments:  will start lexapro reviewed SE. Follow up 1 month or sooner if needed.  Orders:  -     escitalopram (LEXAPRO) 5 MG tablet; Take 1 tablet by mouth daily    Palpitations  -     CBC  -     Comprehensive Metabolic Panel  -     TSH + Free T4 Panel  -     EKG 12 lead  -     Cortisol AM, Total  -     Iron and TIBC    Rash  Comments:  follow up with derm  Orders:  -     triamcinolone (KENALOG) 0.1 % ointment; Apply topically 2 times daily to affected after.  Do not use for >2 weeks consecutively.             HISTORY OF PRESENT ILLNESS  Chantelle Fernando is a 19 y.o. female presents for    Chief Complaint   Patient presents with    Other     Patient would like to discuss possibly getting thyroid and POTS testing.      .    Patient reports she has been having many symptoms over the last few months. Patient reports she will get dizzy from going from sitting to standing. Denies syncope.  She also feels like she has episodes of her heart is racing randomly. Occurs whether she is sitting or standing. Feels she also will break out in sweats randomly. Also feels like she gets rashes easily, she thought it was her eczema but it doesn't clear up with her normal treatment. She has had a swollen lymph node on the right side of her neck and has been there for about two years now. She had ultrasound initially which was normal. Patient concerned for

## 2025-03-12 NOTE — PROGRESS NOTES
\"Have you been to the ER, urgent care clinic since your last visit?  Hospitalized since your last visit?\"    NO    “Have you seen or consulted any other health care providers outside our system since your last visit?”    NO     “Have you had a pap smear?”    YES - Where: 02/17 Nurse/CMA to request most recent records if not in the chart    No cervical cancer screening on file

## 2025-03-13 ENCOUNTER — RESULTS FOLLOW-UP (OUTPATIENT)
Dept: PRIMARY CARE CLINIC | Facility: CLINIC | Age: 20
End: 2025-03-13

## 2025-03-13 LAB
ALBUMIN SERPL-MCNC: 4.4 G/DL (ref 4–5)
ALP SERPL-CCNC: 43 IU/L (ref 44–121)
ALT SERPL-CCNC: 10 IU/L (ref 0–32)
AST SERPL-CCNC: 16 IU/L (ref 0–40)
BILIRUB SERPL-MCNC: 0.3 MG/DL (ref 0–1.2)
BUN SERPL-MCNC: 9 MG/DL (ref 6–20)
BUN/CREAT SERPL: 11 (ref 9–23)
CALCIUM SERPL-MCNC: 9.6 MG/DL (ref 8.7–10.2)
CHLORIDE SERPL-SCNC: 100 MMOL/L (ref 96–106)
CO2 SERPL-SCNC: 22 MMOL/L (ref 20–29)
CREAT SERPL-MCNC: 0.79 MG/DL (ref 0.57–1)
EGFRCR SERPLBLD CKD-EPI 2021: 109 ML/MIN/1.73
ERYTHROCYTE [DISTWIDTH] IN BLOOD BY AUTOMATED COUNT: 12.1 % (ref 11.7–15.4)
GLOBULIN SER CALC-MCNC: 3.3 G/DL (ref 1.5–4.5)
GLUCOSE SERPL-MCNC: 85 MG/DL (ref 70–99)
HCT VFR BLD AUTO: 40.7 % (ref 34–46.6)
HGB BLD-MCNC: 13.8 G/DL (ref 11.1–15.9)
MCH RBC QN AUTO: 30.7 PG (ref 26.6–33)
MCHC RBC AUTO-ENTMCNC: 33.9 G/DL (ref 31.5–35.7)
MCV RBC AUTO: 90 FL (ref 79–97)
PLATELET # BLD AUTO: 296 X10E3/UL (ref 150–450)
POTASSIUM SERPL-SCNC: 3.8 MMOL/L (ref 3.5–5.2)
PROT SERPL-MCNC: 7.7 G/DL (ref 6–8.5)
RBC # BLD AUTO: 4.5 X10E6/UL (ref 3.77–5.28)
SODIUM SERPL-SCNC: 136 MMOL/L (ref 134–144)
T4 FREE SERPL-MCNC: 1.3 NG/DL (ref 0.82–1.77)
TSH SERPL DL<=0.005 MIU/L-ACNC: 1 UIU/ML (ref 0.45–4.5)
WBC # BLD AUTO: 6.4 X10E3/UL (ref 3.4–10.8)

## 2025-03-24 LAB — CORTIS AM PEAK SERPL-MCNC: 23.9 UG/DL (ref 6.2–19.4)

## 2025-04-03 DIAGNOSIS — F41.9 ANXIETY: ICD-10-CM

## 2025-04-03 RX ORDER — ESCITALOPRAM OXALATE 5 MG/1
5 TABLET ORAL DAILY
Qty: 90 TABLET | Refills: 2 | Status: SHIPPED | OUTPATIENT
Start: 2025-04-03

## 2025-04-13 ENCOUNTER — HOSPITAL ENCOUNTER (OUTPATIENT)
Facility: HOSPITAL | Age: 20
End: 2025-04-13
Payer: COMMERCIAL

## 2025-04-13 ENCOUNTER — HOSPITAL ENCOUNTER (OUTPATIENT)
Facility: HOSPITAL | Age: 20
Discharge: HOME | End: 2025-04-16
Payer: COMMERCIAL

## 2025-04-13 DIAGNOSIS — R59.9 ENLARGED LYMPH NODES, UNSPECIFIED: ICD-10-CM

## 2025-04-13 PROCEDURE — 76536 US EXAM OF HEAD AND NECK: CPT

## 2025-04-14 ENCOUNTER — HOSPITAL ENCOUNTER (INPATIENT)
Facility: HOSPITAL | Age: 20
LOS: 3 days | Discharge: HOME | End: 2025-04-18
Attending: STUDENT IN AN ORGANIZED HEALTH CARE EDUCATION/TRAINING PROGRAM | Admitting: STUDENT IN AN ORGANIZED HEALTH CARE EDUCATION/TRAINING PROGRAM
Payer: COMMERCIAL

## 2025-04-14 ENCOUNTER — APPOINTMENT (OUTPATIENT)
Facility: HOSPITAL | Age: 20
End: 2025-04-14
Payer: COMMERCIAL

## 2025-04-14 ENCOUNTER — HOSPITAL ENCOUNTER (EMERGENCY)
Facility: HOSPITAL | Age: 20
Discharge: ANOTHER HOSPITAL | End: 2025-04-14
Attending: EMERGENCY MEDICINE
Payer: COMMERCIAL

## 2025-04-14 ENCOUNTER — TELEMEDICINE (OUTPATIENT)
Dept: PRIMARY CARE CLINIC | Facility: CLINIC | Age: 20
End: 2025-04-14
Payer: COMMERCIAL

## 2025-04-14 VITALS
OXYGEN SATURATION: 98 % | WEIGHT: 136.2 LBS | BODY MASS INDEX: 24.13 KG/M2 | RESPIRATION RATE: 18 BRPM | HEART RATE: 113 BPM | DIASTOLIC BLOOD PRESSURE: 89 MMHG | TEMPERATURE: 98.5 F | HEIGHT: 63 IN | SYSTOLIC BLOOD PRESSURE: 120 MMHG

## 2025-04-14 DIAGNOSIS — R74.01 TRANSAMINITIS: Primary | ICD-10-CM

## 2025-04-14 DIAGNOSIS — E80.6 HYPERBILIRUBINEMIA: ICD-10-CM

## 2025-04-14 DIAGNOSIS — J36 PERITONSILLAR ABSCESS: ICD-10-CM

## 2025-04-14 DIAGNOSIS — R79.89 HIGH SERUM CORTISOL: ICD-10-CM

## 2025-04-14 DIAGNOSIS — F41.9 ANXIETY: ICD-10-CM

## 2025-04-14 DIAGNOSIS — J36 TONSILLAR ABSCESS: ICD-10-CM

## 2025-04-14 DIAGNOSIS — R17 JAUNDICE: ICD-10-CM

## 2025-04-14 DIAGNOSIS — R59.9 ENLARGED LYMPH NODES, UNSPECIFIED: Primary | ICD-10-CM

## 2025-04-14 DIAGNOSIS — R74.8 ELEVATED LIVER ENZYMES: Primary | ICD-10-CM

## 2025-04-14 LAB
ALBUMIN SERPL-MCNC: 3.4 G/DL (ref 3.5–5)
ALBUMIN/GLOB SERPL: 0.8 (ref 1.1–2.2)
ALP SERPL-CCNC: 90 U/L (ref 45–117)
ALT SERPL-CCNC: 351 U/L (ref 12–78)
ANION GAP SERPL CALC-SCNC: 8 MMOL/L (ref 2–12)
APAP SERPL-MCNC: <2 UG/ML (ref 10–30)
APPEARANCE UR: ABNORMAL
AST SERPL-CCNC: 282 U/L (ref 15–37)
BACTERIA URNS QL MICRO: NEGATIVE /HPF
BASOPHILS # BLD: 0.12 K/UL (ref 0–0.1)
BASOPHILS NFR BLD: 1 % (ref 0–1)
BILIRUB SERPL-MCNC: 5.2 MG/DL (ref 0.2–1)
BILIRUB UR QL CFM: POSITIVE
BUN SERPL-MCNC: 8 MG/DL (ref 6–20)
BUN/CREAT SERPL: 10 (ref 12–20)
CALCIUM SERPL-MCNC: 9 MG/DL (ref 8.5–10.1)
CHLORIDE SERPL-SCNC: 105 MMOL/L (ref 97–108)
CK SERPL-CCNC: 133 U/L (ref 26–192)
CO2 SERPL-SCNC: 25 MMOL/L (ref 21–32)
COLOR UR: ABNORMAL
CREAT SERPL-MCNC: 0.82 MG/DL (ref 0.55–1.02)
DIFFERENTIAL METHOD BLD: ABNORMAL
EOSINOPHIL # BLD: 0 K/UL (ref 0–0.4)
EOSINOPHIL NFR BLD: 0 % (ref 0–7)
EPITH CASTS URNS QL MICRO: ABNORMAL /LPF
ERYTHROCYTE [DISTWIDTH] IN BLOOD BY AUTOMATED COUNT: 13.2 % (ref 11.5–14.5)
GLOBULIN SER CALC-MCNC: 4.5 G/DL (ref 2–4)
GLUCOSE SERPL-MCNC: 114 MG/DL (ref 65–100)
GLUCOSE UR STRIP.AUTO-MCNC: NEGATIVE MG/DL
HCG UR QL: NEGATIVE
HCT VFR BLD AUTO: 37.9 % (ref 35–47)
HGB BLD-MCNC: 12.9 G/DL (ref 11.5–16)
HGB UR QL STRIP: ABNORMAL
IMM GRANULOCYTES # BLD AUTO: 0 K/UL
IMM GRANULOCYTES NFR BLD AUTO: 0 %
INR PPP: 1.1 (ref 0.9–1.1)
KETONES UR QL STRIP.AUTO: ABNORMAL MG/DL
LACTATE BLD-SCNC: 1.09 MMOL/L (ref 0.4–2)
LEUKOCYTE ESTERASE UR QL STRIP.AUTO: ABNORMAL
LIPASE SERPL-CCNC: 38 U/L (ref 13–75)
LYMPHOCYTES # BLD: 8.46 K/UL (ref 0.8–3.5)
LYMPHOCYTES NFR BLD: 73 % (ref 12–49)
MCH RBC QN AUTO: 30 PG (ref 26–34)
MCHC RBC AUTO-ENTMCNC: 34 G/DL (ref 30–36.5)
MCV RBC AUTO: 88.1 FL (ref 80–99)
MONOCYTES # BLD: 0.35 K/UL (ref 0–1)
MONOCYTES NFR BLD: 3 % (ref 5–13)
NEUTS SEG # BLD: 2.67 K/UL (ref 1.8–8)
NEUTS SEG NFR BLD: 23 % (ref 32–75)
NITRITE UR QL STRIP.AUTO: POSITIVE
NRBC # BLD: 0 K/UL (ref 0–0.01)
NRBC BLD-RTO: 0 PER 100 WBC
PH UR STRIP: 5.5 (ref 5–8)
PLATELET # BLD AUTO: 195 K/UL (ref 150–400)
PMV BLD AUTO: 10 FL (ref 8.9–12.9)
POTASSIUM SERPL-SCNC: 3.8 MMOL/L (ref 3.5–5.1)
PROT SERPL-MCNC: 7.9 G/DL (ref 6.4–8.2)
PROT UR STRIP-MCNC: 30 MG/DL
PROTHROMBIN TIME: 11.4 SEC (ref 9.2–11.2)
RBC # BLD AUTO: 4.3 M/UL (ref 3.8–5.2)
RBC #/AREA URNS HPF: ABNORMAL /HPF (ref 0–5)
RBC MORPH BLD: ABNORMAL
SODIUM SERPL-SCNC: 138 MMOL/L (ref 136–145)
SP GR UR REFRACTOMETRY: 1.02 (ref 1–1.03)
URINE CULTURE IF INDICATED: ABNORMAL
UROBILINOGEN UR QL STRIP.AUTO: 1 EU/DL (ref 0.2–1)
WBC # BLD AUTO: 11.6 K/UL (ref 3.6–11)
WBC MORPH BLD: ABNORMAL
WBC URNS QL MICRO: ABNORMAL /HPF (ref 0–4)

## 2025-04-14 PROCEDURE — 2580000003 HC RX 258

## 2025-04-14 PROCEDURE — 85610 PROTHROMBIN TIME: CPT

## 2025-04-14 PROCEDURE — 80143 DRUG ASSAY ACETAMINOPHEN: CPT

## 2025-04-14 PROCEDURE — 6360000002 HC RX W HCPCS

## 2025-04-14 PROCEDURE — 99214 OFFICE O/P EST MOD 30 MIN: CPT | Performed by: NURSE PRACTITIONER

## 2025-04-14 PROCEDURE — 85025 COMPLETE CBC W/AUTO DIFF WBC: CPT

## 2025-04-14 PROCEDURE — 83605 ASSAY OF LACTIC ACID: CPT

## 2025-04-14 PROCEDURE — 99285 EMERGENCY DEPT VISIT HI MDM: CPT

## 2025-04-14 PROCEDURE — 83690 ASSAY OF LIPASE: CPT

## 2025-04-14 PROCEDURE — 82550 ASSAY OF CK (CPK): CPT

## 2025-04-14 PROCEDURE — 81001 URINALYSIS AUTO W/SCOPE: CPT

## 2025-04-14 PROCEDURE — 87040 BLOOD CULTURE FOR BACTERIA: CPT

## 2025-04-14 PROCEDURE — 86720 LEPTOSPIRA ANTIBODY: CPT

## 2025-04-14 PROCEDURE — 76705 ECHO EXAM OF ABDOMEN: CPT

## 2025-04-14 PROCEDURE — 80053 COMPREHEN METABOLIC PANEL: CPT

## 2025-04-14 PROCEDURE — 74177 CT ABD & PELVIS W/CONTRAST: CPT

## 2025-04-14 PROCEDURE — 81025 URINE PREGNANCY TEST: CPT

## 2025-04-14 PROCEDURE — 80074 ACUTE HEPATITIS PANEL: CPT

## 2025-04-14 PROCEDURE — 96375 TX/PRO/DX INJ NEW DRUG ADDON: CPT

## 2025-04-14 PROCEDURE — 87086 URINE CULTURE/COLONY COUNT: CPT

## 2025-04-14 PROCEDURE — 70491 CT SOFT TISSUE NECK W/DYE: CPT

## 2025-04-14 PROCEDURE — 96365 THER/PROPH/DIAG IV INF INIT: CPT

## 2025-04-14 PROCEDURE — 6360000004 HC RX CONTRAST MEDICATION

## 2025-04-14 PROCEDURE — 36415 COLL VENOUS BLD VENIPUNCTURE: CPT

## 2025-04-14 RX ORDER — IOPAMIDOL 755 MG/ML
100 INJECTION, SOLUTION INTRAVASCULAR
Status: COMPLETED | OUTPATIENT
Start: 2025-04-14 | End: 2025-04-14

## 2025-04-14 RX ORDER — KETOROLAC TROMETHAMINE 30 MG/ML
30 INJECTION, SOLUTION INTRAMUSCULAR; INTRAVENOUS
Status: COMPLETED | OUTPATIENT
Start: 2025-04-14 | End: 2025-04-14

## 2025-04-14 RX ADMIN — IOPAMIDOL 100 ML: 755 INJECTION, SOLUTION INTRAVENOUS at 17:37

## 2025-04-14 RX ADMIN — SODIUM CHLORIDE 3000 MG: 9 INJECTION, SOLUTION INTRAVENOUS at 20:12

## 2025-04-14 RX ADMIN — KETOROLAC TROMETHAMINE 30 MG: 30 INJECTION, SOLUTION INTRAMUSCULAR at 21:05

## 2025-04-14 ASSESSMENT — PAIN DESCRIPTION - DESCRIPTORS
DESCRIPTORS: ACHING
DESCRIPTORS: SORE;ACHING

## 2025-04-14 ASSESSMENT — PAIN - FUNCTIONAL ASSESSMENT
PAIN_FUNCTIONAL_ASSESSMENT: 0-10
PAIN_FUNCTIONAL_ASSESSMENT: 0-10

## 2025-04-14 ASSESSMENT — PAIN DESCRIPTION - ORIENTATION
ORIENTATION: LEFT
ORIENTATION: LEFT

## 2025-04-14 ASSESSMENT — PAIN DESCRIPTION - LOCATION
LOCATION: THROAT
LOCATION: THROAT

## 2025-04-14 ASSESSMENT — PAIN SCALES - GENERAL
PAINLEVEL_OUTOF10: 0
PAINLEVEL_OUTOF10: 10
PAINLEVEL_OUTOF10: 10

## 2025-04-14 ASSESSMENT — ENCOUNTER SYMPTOMS: SHORTNESS OF BREATH: 0

## 2025-04-14 NOTE — ED TRIAGE NOTES
Ambulatory with complaints of left sided tonsil pain and \"a spot\" on her tonsil since last Wednesday. Tested negative for the flu Covid-19.     Started on penicillin Friday, on Saturday she noticed a yellow tint to her eyes and tea colored urine since Thursday.     [facility] and was [facility] stating \"They said if I didn't see improvement then come to the ER\".

## 2025-04-14 NOTE — ED PROVIDER NOTES
[facility] EMERGENCY DEPARTMENT  EMERGENCY DEPARTMENT ENCOUNTER      Pt Name: Chantelle Fernando  MRN: [id number]  Birthdate 2005  Date of evaluation: 4/14/2025  Provider: Carlos Nevarez PA-C    CHIEF COMPLAINT       Chief Complaint   Patient presents with    Pharyngitis         HISTORY OF PRESENT ILLNESS    Patient is a 19-year-old female with history of anxiety and dermatitis who presents to the emergency room with reports of left-sided tonsil pain, yellow tent to her eyes, and tea colored urine.  Patient reports that last Wednesday she started having myalgias, fever over 100, and sore throat.  Patient reports she thought she had influenza so she went to get tested at [facility].  Patient had a negative influenza A, B, and strep.  Patient was told her throat looked bad so they started her on penicillin on Friday.  Patient reports that on Thursday she noticed that the white parts of her eyes had a yellow tent to them and she had tea colored urine.  Patient reports that on [facility] and was told to stop taking the penicillin and started on a Medrol Dosepak.  Patient reports she was told if there is no improvement to come to the emergency room.  Patient reports she does have upper abdominal pain that is described as a tightness.  Patient reports no improvement of her sore throat and reports it is mainly on her left tonsil.  Patient also had an outpatient ultrasound which showed submandibular lymph nodes and referred to ENT for fine-needle aspiration. Patient denies chest pain, shortness of breath, nausea or vomiting, diarrhea or constipation, dizziness, lightheadedness.  Patient reports no excessive Tylenol use.  Patient denies alcohol use, denies smoking/vaping or illicit drug use.    Of note, patient does work at a [employer] and reports increased Leptospirosis cases.           Nursing Notes were reviewed.    REVIEW OF SYSTEMS       Review of Systems      PAST MEDICAL

## 2025-04-14 NOTE — PROGRESS NOTES
Chantelle Fernando is a 19 y.o. female who was seen via telemedicine today 4/14/2025).    Assessment & Plan:   Below is the assessment and plan developed based on review of pertinent history, physical exam, labs, studies, and medications.    1. Enlarged lymph nodes  Comments:  will refer to ENT for possibly FNA  Orders:  -     [facility] [place] [place]  2. Anxiety  Comments:  improved since starting lexapro. will continue current dose.  3. High serum cortisol  Comments:  most of her symptoms have improved since starting lexapro and only mildly elevated on labs; will continue to monitor for now      No follow-ups on file.    Subjective:   Chantelle was seen today for Other (Medication )     Anxiety: patient reports the lexapro has helped her anxiety. She reports she feels less anxious overall. She has noticed she is more social. She is not \"sweating\" as much. She reports her chest pain and palpitations have improved.     Patient reports last week she thought she had the flu. She went to [facility] clinic and her flu test was negative. Her left tonsil was super swollen and hurts. Reports every time she swallows it hurts. She has a white patch to her left tonsil. She was given PCN.   [facility] because her urine was dark and she feels her eyes have a yellow tint to them. Reports she has tried increasing her water intake but that has not helped. She had her urine checked and bilirubin was high. Given steroids and told to stop PCN.  She was told at work today that she looked \"yellow\".  She was told if It did not improve to go to the ER.     Patient had ultrasound of her lymph nodes in her neck done yesterday and it showed bilateral lymph nodes.     Review of Systems   Respiratory:  Negative for shortness of breath.    Neurological:  Negative for dizziness.   Psychiatric/Behavioral:  The patient is not nervous/anxious.

## 2025-04-15 ENCOUNTER — RESULTS FOLLOW-UP (OUTPATIENT)
Dept: PRIMARY CARE CLINIC | Facility: CLINIC | Age: 20
End: 2025-04-15

## 2025-04-15 PROBLEM — B17.9 ACUTE HEPATITIS: Status: ACTIVE | Noted: 2025-04-15

## 2025-04-15 LAB
-: NORMAL
ALBUMIN SERPL-MCNC: 2.8 G/DL (ref 3.5–5)
ALBUMIN/GLOB SERPL: 0.7 (ref 1.1–2.2)
ALP SERPL-CCNC: 77 U/L (ref 45–117)
ALT SERPL-CCNC: 395 U/L (ref 12–78)
ANION GAP SERPL CALC-SCNC: 5 MMOL/L (ref 2–12)
AST SERPL-CCNC: 309 U/L (ref 15–37)
B PERT DNA SPEC QL NAA+PROBE: NOT DETECTED
BACTERIA SPEC CULT: NORMAL
BASOPHILS # BLD: 0 K/UL (ref 0–0.1)
BASOPHILS NFR BLD: 0 % (ref 0–1)
BILIRUB SERPL-MCNC: 4.7 MG/DL (ref 0.2–1)
BORDETELLA PARAPERTUSSIS BY PCR: NOT DETECTED
BUN SERPL-MCNC: 8 MG/DL (ref 6–20)
BUN/CREAT SERPL: 10 (ref 12–20)
C PNEUM DNA SPEC QL NAA+PROBE: NOT DETECTED
CALCIUM SERPL-MCNC: 8.7 MG/DL (ref 8.5–10.1)
CHLORIDE SERPL-SCNC: 106 MMOL/L (ref 97–108)
CO2 SERPL-SCNC: 25 MMOL/L (ref 21–32)
CREAT SERPL-MCNC: 0.8 MG/DL (ref 0.55–1.02)
CRP SERPL-MCNC: 0.85 MG/DL (ref 0–0.3)
DIFFERENTIAL METHOD BLD: ABNORMAL
EOSINOPHIL # BLD: 0 K/UL (ref 0–0.4)
EOSINOPHIL NFR BLD: 0 % (ref 0–7)
ERYTHROCYTE [DISTWIDTH] IN BLOOD BY AUTOMATED COUNT: 13.2 % (ref 11.5–14.5)
ERYTHROCYTE [SEDIMENTATION RATE] IN BLOOD: 10 MM/HR (ref 0–20)
FLUAV SUBTYP SPEC NAA+PROBE: NOT DETECTED
FLUBV RNA SPEC QL NAA+PROBE: NOT DETECTED
GLOBULIN SER CALC-MCNC: 4.1 G/DL (ref 2–4)
GLUCOSE SERPL-MCNC: 90 MG/DL (ref 65–100)
HADV DNA SPEC QL NAA+PROBE: NOT DETECTED
HAV IGM SER QL: NONREACTIVE
HAV IGM SER QL: NONREACTIVE
HBV CORE IGM SER QL: NONREACTIVE
HBV CORE IGM SER QL: NONREACTIVE
HBV SURFACE AG SER QL: 0.17 INDEX
HBV SURFACE AG SER QL: <0.1 INDEX
HBV SURFACE AG SER QL: NEGATIVE
HBV SURFACE AG SER QL: NEGATIVE
HCOV 229E RNA SPEC QL NAA+PROBE: NOT DETECTED
HCOV HKU1 RNA SPEC QL NAA+PROBE: NOT DETECTED
HCOV NL63 RNA SPEC QL NAA+PROBE: NOT DETECTED
HCOV OC43 RNA SPEC QL NAA+PROBE: NOT DETECTED
HCT VFR BLD AUTO: 33.6 % (ref 35–47)
HCV AB SER IA-ACNC: 0.06 INDEX
HCV AB SER IA-ACNC: 0.07 INDEX
HCV AB SERPL QL IA: NONREACTIVE
HCV AB SERPL QL IA: NONREACTIVE
HETEROPH AB BLD QL IA: NEGATIVE
HGB BLD-MCNC: 11.3 G/DL (ref 11.5–16)
HMPV RNA SPEC QL NAA+PROBE: NOT DETECTED
HPIV1 RNA SPEC QL NAA+PROBE: NOT DETECTED
HPIV2 RNA SPEC QL NAA+PROBE: NOT DETECTED
HPIV3 RNA SPEC QL NAA+PROBE: NOT DETECTED
HPIV4 RNA SPEC QL NAA+PROBE: NOT DETECTED
IMM GRANULOCYTES # BLD AUTO: 0 K/UL
IMM GRANULOCYTES NFR BLD AUTO: 0 %
INR PPP: 1.1 (ref 0.9–1.1)
LYMPHOCYTES # BLD: 5.14 K/UL (ref 0.8–3.5)
LYMPHOCYTES NFR BLD: 59 % (ref 12–49)
M PNEUMO DNA SPEC QL NAA+PROBE: NOT DETECTED
MCH RBC QN AUTO: 29.7 PG (ref 26–34)
MCHC RBC AUTO-ENTMCNC: 33.6 G/DL (ref 30–36.5)
MCV RBC AUTO: 88.2 FL (ref 80–99)
MONOCYTES # BLD: 0.78 K/UL (ref 0–1)
MONOCYTES NFR BLD: 9 % (ref 5–13)
NEUTS SEG # BLD: 2.78 K/UL (ref 1.8–8)
NEUTS SEG NFR BLD: 32 % (ref 32–75)
NRBC # BLD: 0 K/UL (ref 0–0.01)
NRBC BLD-RTO: 0 PER 100 WBC
PLATELET # BLD AUTO: 160 K/UL (ref 150–400)
PMV BLD AUTO: 10.2 FL (ref 8.9–12.9)
POTASSIUM SERPL-SCNC: 3.3 MMOL/L (ref 3.5–5.1)
PROT SERPL-MCNC: 6.9 G/DL (ref 6.4–8.2)
PROTHROMBIN TIME: 11.4 SEC (ref 9.2–11.2)
RBC # BLD AUTO: 3.81 M/UL (ref 3.8–5.2)
RBC MORPH BLD: ABNORMAL
RSV RNA SPEC QL NAA+PROBE: NOT DETECTED
RV+EV RNA SPEC QL NAA+PROBE: NOT DETECTED
SARS-COV-2 RNA RESP QL NAA+PROBE: NOT DETECTED
SERVICE CMNT-IMP: NORMAL
SODIUM SERPL-SCNC: 136 MMOL/L (ref 136–145)
TSH SERPL DL<=0.05 MIU/L-ACNC: 2.31 UIU/ML (ref 0.36–3.74)
WBC # BLD AUTO: 8.7 K/UL (ref 3.6–11)
WBC MORPH BLD: ABNORMAL

## 2025-04-15 PROCEDURE — 87798 DETECT AGENT NOS DNA AMP: CPT

## 2025-04-15 PROCEDURE — 6370000000 HC RX 637 (ALT 250 FOR IP): Performed by: STUDENT IN AN ORGANIZED HEALTH CARE EDUCATION/TRAINING PROGRAM

## 2025-04-15 PROCEDURE — 1100000000 HC RM PRIVATE

## 2025-04-15 PROCEDURE — 86645 CMV ANTIBODY IGM: CPT

## 2025-04-15 PROCEDURE — 2500000003 HC RX 250 WO HCPCS: Performed by: STUDENT IN AN ORGANIZED HEALTH CARE EDUCATION/TRAINING PROGRAM

## 2025-04-15 PROCEDURE — 80074 ACUTE HEPATITIS PANEL: CPT

## 2025-04-15 PROCEDURE — 82390 ASSAY OF CERULOPLASMIN: CPT

## 2025-04-15 PROCEDURE — 85610 PROTHROMBIN TIME: CPT

## 2025-04-15 PROCEDURE — 86235 NUCLEAR ANTIGEN ANTIBODY: CPT

## 2025-04-15 PROCEDURE — 85025 COMPLETE CBC W/AUTO DIFF WBC: CPT

## 2025-04-15 PROCEDURE — 86658 ENTEROVIRUS ANTIBODY: CPT

## 2025-04-15 PROCEDURE — 2580000003 HC RX 258: Performed by: STUDENT IN AN ORGANIZED HEALTH CARE EDUCATION/TRAINING PROGRAM

## 2025-04-15 PROCEDURE — 87532 HHV-6 DNA AMP PROBE: CPT

## 2025-04-15 PROCEDURE — 85652 RBC SED RATE AUTOMATED: CPT

## 2025-04-15 PROCEDURE — 86308 HETEROPHILE ANTIBODY SCREEN: CPT

## 2025-04-15 PROCEDURE — 6360000002 HC RX W HCPCS: Performed by: STUDENT IN AN ORGANIZED HEALTH CARE EDUCATION/TRAINING PROGRAM

## 2025-04-15 PROCEDURE — 86140 C-REACTIVE PROTEIN: CPT

## 2025-04-15 PROCEDURE — 0202U NFCT DS 22 TRGT SARS-COV-2: CPT

## 2025-04-15 PROCEDURE — 86644 CMV ANTIBODY: CPT

## 2025-04-15 PROCEDURE — 86225 DNA ANTIBODY NATIVE: CPT

## 2025-04-15 PROCEDURE — 86665 EPSTEIN-BARR CAPSID VCA: CPT

## 2025-04-15 PROCEDURE — 86664 EPSTEIN-BARR NUCLEAR ANTIGEN: CPT

## 2025-04-15 PROCEDURE — 84443 ASSAY THYROID STIM HORMONE: CPT

## 2025-04-15 PROCEDURE — 80053 COMPREHEN METABOLIC PANEL: CPT

## 2025-04-15 RX ORDER — MORPHINE SULFATE 2 MG/ML
2 INJECTION, SOLUTION INTRAMUSCULAR; INTRAVENOUS EVERY 4 HOURS PRN
Refills: 0 | Status: DISCONTINUED | OUTPATIENT
Start: 2025-04-15 | End: 2025-04-18 | Stop reason: HOSPADM

## 2025-04-15 RX ORDER — POLYETHYLENE GLYCOL 3350 17 G/17G
17 POWDER, FOR SOLUTION ORAL DAILY PRN
Status: DISCONTINUED | OUTPATIENT
Start: 2025-04-15 | End: 2025-04-18 | Stop reason: HOSPADM

## 2025-04-15 RX ORDER — ESCITALOPRAM OXALATE 10 MG/1
5 TABLET ORAL DAILY
Status: DISCONTINUED | OUTPATIENT
Start: 2025-04-15 | End: 2025-04-18 | Stop reason: HOSPADM

## 2025-04-15 RX ORDER — SODIUM CHLORIDE 0.9 % (FLUSH) 0.9 %
5-40 SYRINGE (ML) INJECTION PRN
Status: DISCONTINUED | OUTPATIENT
Start: 2025-04-15 | End: 2025-04-18 | Stop reason: HOSPADM

## 2025-04-15 RX ORDER — CETIRIZINE HYDROCHLORIDE 10 MG/1
10 TABLET ORAL DAILY
Status: DISCONTINUED | OUTPATIENT
Start: 2025-04-15 | End: 2025-04-18 | Stop reason: HOSPADM

## 2025-04-15 RX ORDER — MAGNESIUM SULFATE IN WATER 40 MG/ML
2000 INJECTION, SOLUTION INTRAVENOUS PRN
Status: DISCONTINUED | OUTPATIENT
Start: 2025-04-15 | End: 2025-04-18 | Stop reason: HOSPADM

## 2025-04-15 RX ORDER — ACETAMINOPHEN 650 MG/1
650 SUPPOSITORY RECTAL EVERY 6 HOURS PRN
Status: DISCONTINUED | OUTPATIENT
Start: 2025-04-15 | End: 2025-04-15

## 2025-04-15 RX ORDER — DEXAMETHASONE SODIUM PHOSPHATE 10 MG/ML
6 INJECTION, SOLUTION INTRAMUSCULAR; INTRAVENOUS EVERY 24 HOURS
Status: DISCONTINUED | OUTPATIENT
Start: 2025-04-15 | End: 2025-04-15

## 2025-04-15 RX ORDER — FLUTICASONE PROPIONATE 50 MCG
1 SPRAY, SUSPENSION (ML) NASAL DAILY
Status: DISCONTINUED | OUTPATIENT
Start: 2025-04-15 | End: 2025-04-18 | Stop reason: HOSPADM

## 2025-04-15 RX ORDER — SODIUM CHLORIDE, SODIUM LACTATE, POTASSIUM CHLORIDE, AND CALCIUM CHLORIDE .6; .31; .03; .02 G/100ML; G/100ML; G/100ML; G/100ML
1000 INJECTION, SOLUTION INTRAVENOUS ONCE
Status: COMPLETED | OUTPATIENT
Start: 2025-04-15 | End: 2025-04-15

## 2025-04-15 RX ORDER — CLINDAMYCIN PHOSPHATE 900 MG/50ML
900 INJECTION, SOLUTION INTRAVENOUS EVERY 8 HOURS
Status: DISCONTINUED | OUTPATIENT
Start: 2025-04-15 | End: 2025-04-18 | Stop reason: HOSPADM

## 2025-04-15 RX ORDER — SODIUM CHLORIDE 0.9 % (FLUSH) 0.9 %
5-40 SYRINGE (ML) INJECTION EVERY 12 HOURS SCHEDULED
Status: DISCONTINUED | OUTPATIENT
Start: 2025-04-15 | End: 2025-04-18 | Stop reason: HOSPADM

## 2025-04-15 RX ORDER — POTASSIUM CHLORIDE 750 MG/1
40 TABLET, EXTENDED RELEASE ORAL PRN
Status: DISCONTINUED | OUTPATIENT
Start: 2025-04-15 | End: 2025-04-18 | Stop reason: HOSPADM

## 2025-04-15 RX ORDER — SODIUM CHLORIDE, SODIUM LACTATE, POTASSIUM CHLORIDE, CALCIUM CHLORIDE 600; 310; 30; 20 MG/100ML; MG/100ML; MG/100ML; MG/100ML
INJECTION, SOLUTION INTRAVENOUS CONTINUOUS
Status: DISPENSED | OUTPATIENT
Start: 2025-04-15 | End: 2025-04-16

## 2025-04-15 RX ORDER — ONDANSETRON 4 MG/1
4 TABLET, ORALLY DISINTEGRATING ORAL EVERY 8 HOURS PRN
Status: DISCONTINUED | OUTPATIENT
Start: 2025-04-15 | End: 2025-04-18 | Stop reason: HOSPADM

## 2025-04-15 RX ORDER — ACETAMINOPHEN 325 MG/1
650 TABLET ORAL EVERY 6 HOURS PRN
Status: DISCONTINUED | OUTPATIENT
Start: 2025-04-15 | End: 2025-04-15

## 2025-04-15 RX ORDER — POTASSIUM CHLORIDE 7.45 MG/ML
10 INJECTION INTRAVENOUS PRN
Status: DISCONTINUED | OUTPATIENT
Start: 2025-04-15 | End: 2025-04-18 | Stop reason: HOSPADM

## 2025-04-15 RX ORDER — SODIUM CHLORIDE 9 MG/ML
INJECTION, SOLUTION INTRAVENOUS PRN
Status: DISCONTINUED | OUTPATIENT
Start: 2025-04-15 | End: 2025-04-18 | Stop reason: HOSPADM

## 2025-04-15 RX ORDER — ONDANSETRON 2 MG/ML
4 INJECTION INTRAMUSCULAR; INTRAVENOUS EVERY 6 HOURS PRN
Status: DISCONTINUED | OUTPATIENT
Start: 2025-04-15 | End: 2025-04-18 | Stop reason: HOSPADM

## 2025-04-15 RX ADMIN — MORPHINE SULFATE 2 MG: 2 INJECTION, SOLUTION INTRAMUSCULAR; INTRAVENOUS at 23:16

## 2025-04-15 RX ADMIN — ESCITALOPRAM OXALATE 5 MG: 10 TABLET ORAL at 09:31

## 2025-04-15 RX ADMIN — CLINDAMYCIN PHOSPHATE 900 MG: 900 INJECTION, SOLUTION INTRAVENOUS at 09:30

## 2025-04-15 RX ADMIN — MORPHINE SULFATE 2 MG: 2 INJECTION, SOLUTION INTRAMUSCULAR; INTRAVENOUS at 05:29

## 2025-04-15 RX ADMIN — MORPHINE SULFATE 2 MG: 2 INJECTION, SOLUTION INTRAMUSCULAR; INTRAVENOUS at 10:12

## 2025-04-15 RX ADMIN — SODIUM CHLORIDE, POTASSIUM CHLORIDE, SODIUM LACTATE AND CALCIUM CHLORIDE: 600; 310; 30; 20 INJECTION, SOLUTION INTRAVENOUS at 22:55

## 2025-04-15 RX ADMIN — MORPHINE SULFATE 2 MG: 2 INJECTION, SOLUTION INTRAMUSCULAR; INTRAVENOUS at 14:10

## 2025-04-15 RX ADMIN — MORPHINE SULFATE 2 MG: 2 INJECTION, SOLUTION INTRAMUSCULAR; INTRAVENOUS at 19:26

## 2025-04-15 RX ADMIN — CLINDAMYCIN PHOSPHATE 900 MG: 900 INJECTION, SOLUTION INTRAVENOUS at 18:59

## 2025-04-15 RX ADMIN — CLINDAMYCIN PHOSPHATE 900 MG: 900 INJECTION, SOLUTION INTRAVENOUS at 02:59

## 2025-04-15 RX ADMIN — SODIUM CHLORIDE, PRESERVATIVE FREE 10 ML: 5 INJECTION INTRAVENOUS at 09:32

## 2025-04-15 RX ADMIN — SODIUM CHLORIDE, SODIUM LACTATE, POTASSIUM CHLORIDE, AND CALCIUM CHLORIDE 1000 ML: .6; .31; .03; .02 INJECTION, SOLUTION INTRAVENOUS at 05:28

## 2025-04-15 RX ADMIN — SODIUM CHLORIDE, POTASSIUM CHLORIDE, SODIUM LACTATE AND CALCIUM CHLORIDE: 600; 310; 30; 20 INJECTION, SOLUTION INTRAVENOUS at 01:22

## 2025-04-15 RX ADMIN — FLUTICASONE PROPIONATE 1 SPRAY: 50 SPRAY, METERED NASAL at 10:12

## 2025-04-15 RX ADMIN — CETIRIZINE HYDROCHLORIDE 10 MG: 10 TABLET, FILM COATED ORAL at 09:31

## 2025-04-15 RX ADMIN — SODIUM CHLORIDE, POTASSIUM CHLORIDE, SODIUM LACTATE AND CALCIUM CHLORIDE: 600; 310; 30; 20 INJECTION, SOLUTION INTRAVENOUS at 19:00

## 2025-04-15 ASSESSMENT — PAIN DESCRIPTION - LOCATION
LOCATION: THROAT
LOCATION: THROAT
LOCATION: GROIN;THROAT
LOCATION: THROAT

## 2025-04-15 ASSESSMENT — PAIN SCALES - GENERAL
PAINLEVEL_OUTOF10: 9
PAINLEVEL_OUTOF10: 6
PAINLEVEL_OUTOF10: 5
PAINLEVEL_OUTOF10: 8
PAINLEVEL_OUTOF10: 7
PAINLEVEL_OUTOF10: 2
PAINLEVEL_OUTOF10: 6
PAINLEVEL_OUTOF10: 9
PAINLEVEL_OUTOF10: 4
PAINLEVEL_OUTOF10: 2

## 2025-04-15 ASSESSMENT — PAIN - FUNCTIONAL ASSESSMENT
PAIN_FUNCTIONAL_ASSESSMENT: ACTIVITIES ARE NOT PREVENTED
PAIN_FUNCTIONAL_ASSESSMENT: 0-10
PAIN_FUNCTIONAL_ASSESSMENT: ACTIVITIES ARE NOT PREVENTED
PAIN_FUNCTIONAL_ASSESSMENT: 0-10
PAIN_FUNCTIONAL_ASSESSMENT: ACTIVITIES ARE NOT PREVENTED
PAIN_FUNCTIONAL_ASSESSMENT: ACTIVITIES ARE NOT PREVENTED

## 2025-04-15 ASSESSMENT — PAIN DESCRIPTION - PAIN TYPE
TYPE: ACUTE PAIN

## 2025-04-15 ASSESSMENT — PAIN DESCRIPTION - ORIENTATION
ORIENTATION: LOWER
ORIENTATION: MID
ORIENTATION: LOWER
ORIENTATION: MID
ORIENTATION: MID
ORIENTATION: LEFT
ORIENTATION: MID
ORIENTATION: LEFT

## 2025-04-15 ASSESSMENT — PAIN DESCRIPTION - DESCRIPTORS
DESCRIPTORS: SORE
DESCRIPTORS: ACHING
DESCRIPTORS: ACHING
DESCRIPTORS: SORE

## 2025-04-15 ASSESSMENT — PAIN DESCRIPTION - ONSET
ONSET: ON-GOING
ONSET: ON-GOING

## 2025-04-15 ASSESSMENT — PAIN DESCRIPTION - FREQUENCY
FREQUENCY: CONTINUOUS

## 2025-04-15 NOTE — PROGRESS NOTES
Consult received.  Chart reviewed and CT reviewed.  Small left intratonsillar abscess.  On Clindamycin after failing PCN as outpatient for strep pharyngitis earlier this week.  Will be by later today to examine her, but this may clear with IV antibiotics alone and not need drainage.  Full note to come.    Edmond Ring MD  [facility]   [address], [address]  [place] [address]   (o) [phone]  (f) [phone]

## 2025-04-15 NOTE — PROGRESS NOTES
[facility]                                                                                          Hospitalist Progress Note  Katarina Booth PA-C  Answering service: [phone] OR [phone] from in house phone        Date of Service:  4/15/2025  NAME:  Chantelle Fernando  :  2005  MRN:  [id number]       Admission Summary:     Chantelle Fernando is a 19 y.o. female with history of generalized anxiety disorder, oral contraceptive use who presented to [facility] ED with concerns of painful and swollen left tonsil as well as noticing yellow discoloration of her and dark-colored urine.  She reports near 1.5-week of progressive sore throat and left cervical lymphadenopathy.  She has had some progressive odynophagia.  No recent travel and no known sick contacts.  Currently works as a .  Denies IV drug use history.     The patient denies any fever, chills, chest or abdominal pain, nausea, vomiting, cough, congestion, recent illness, palpitations, or dysuria.  Remarkable vitals on ER Presentation: vss  Labs Remarkable for: T. bili 5.2, , .  UA: Cloudy, trace ketones, trace blood, nitrites, leukocyte esterase  ER Images: CT neck and soft tissue: Left tonsillar/peritonsillar abscess.  Enlarged left cervical and submandibular lymph nodes without central necrosis or separation.  Paranasal sinus inflammatory disease.  CT abdomen pelvis, RUQ US: No acute process  ER Rx: Unasyn, Toradol 30 mg       Interval history / Subjective:     Seen and examined this a.m., laying in bed comfortably.  Denies chest pain, abdominal pain, nausea/vomiting, or any other complaints at this time.  Discussed with ENT, no need for drainage     Assessment & Plan:     Transaminitis   Hyperbilirubinemia - improving   Hyperlipidemia  Cholestatic jaundice  -Presentation following sequelae of recent viral prodrome and acute tonsillitis raises concerns for possible

## 2025-04-15 NOTE — H&P
History & Physical    Primary Care Provider: Elizabeth Eduardo APRN - CNP  Source of Information: Patient and chart review    History of Presenting Illness:   Chantelle Fernando is a 19 y.o. female with history of generalized anxiety disorder, oral contraceptive use who presented to [facility] ED with concerns of painful and swollen left tonsil as well as noticing yellow discoloration of her and dark-colored urine.  She reports near 1.5-week of progressive sore throat and left cervical lymphadenopathy.  She has had some progressive odynophagia.  No recent travel and no known sick contacts.  Currently works as a .  Denies IV drug use history.    The patient denies any fever, chills, chest or abdominal pain, nausea, vomiting, cough, congestion, recent illness, palpitations, or dysuria.  Remarkable vitals on ER Presentation: vss  Labs Remarkable for: T. bili 5.2, , .  UA: Cloudy, trace ketones, trace blood, nitrites, leukocyte esterase  ER Images: CT neck and soft tissue: Left tonsillar/peritonsillar abscess.  Enlarged left cervical and submandibular lymph nodes without central necrosis or separation.  Paranasal sinus inflammatory disease.  CT abdomen pelvis, RUQ US: No acute process  ER Rx: Unasyn, Toradol 30 mg     Review of Systems:  Pertinent items are noted in the History of Present Illness.     Past Medical History:   Diagnosis Date    Contact dermatitis and eczema due to cause       No past surgical history on file.  Prior to Admission medications    Medication Sig Start Date End Date Taking? Authorizing Provider   escitalopram (LEXAPRO) 5 MG tablet TAKE 1 TABLET BY MOUTH EVERY DAY 4/3/25   Elizabeth Eduardo, APRN - CNP   triamcinolone (KENALOG) 0.1 % ointment Apply topically 2 times daily to affected after.  Do not use for >2 weeks consecutively. 3/12/25   Elizabeth Eduardo, APRN - CNP   cetirizine (ZYRTEC) 10 MG tablet TAKE 1 TABLET BY MOUTH EVERY DAY 4/23/24

## 2025-04-15 NOTE — ED PROVIDER NOTES
[facility] EMERGENCY DEPARTMENT  EMERGENCY DEPARTMENT ENCOUNTER      Pt Name: Chantelle Fernando  MRN: [id number]  Birthdate 2005  Date of evaluation: 4/14/2025  Provider: STEPHANIE Crespo    CHIEF COMPLAINT       Chief Complaint   Patient presents with    Jaundice         HISTORY OF PRESENT ILLNESS   (Location/Symptom, Timing/Onset, Context/Setting, Quality, Duration, Modifying Factors, Severity)  Note limiting factors.   Pt arrives as a transfer from :    \"Patient is a 19-year-old female with history of anxiety and dermatitis who presents to the emergency room with reports of left-sided tonsil pain, yellow tent to her eyes, and tea colored urine.  Patient reports that last Wednesday she started having myalgias, fever over 100, and sore throat.  Patient reports she thought she had influenza so she went to get tested at [facility].  Patient had a negative influenza A, B, and strep.  Patient was told her throat looked bad so they started her on penicillin on Friday.  Patient reports that on Thursday she noticed that the white parts of her eyes had a yellow tent to them and she had tea colored urine.  Patient reports that on Saturday [facility] and was told to stop taking the penicillin and started on a Medrol Dosepak.  Patient reports she was told if there is no improvement to come to the emergency room.  Patient reports she does have upper abdominal pain that is described as a tightness.  Patient reports no improvement of her sore throat and reports it is mainly on her left tonsil.  Patient also had an outpatient ultrasound which showed submandibular lymph nodes and referred to ENT for fine-needle aspiration. Patient denies chest pain, shortness of breath, nausea or vomiting, diarrhea or constipation, dizziness, lightheadedness.  Patient reports no excessive Tylenol use.  Patient denies alcohol use, denies smoking/vaping or illicit drug use.\"    Pt notes that she is relatively sure  that urgent care checked a mono test.  Reports feeling fatigued and bloated at current time.    PMHx: anxiety, dermatitis  Social: works as a .      The history is provided by the patient, medical records and a parent.         Review of External Medical Records:     Nursing Notes were reviewed.    REVIEW OF SYSTEMS    (2-9 systems for level 4, 10 or more for level 5)     Review of Systems    Except as noted above the remainder of the review of systems was reviewed and negative.       PAST MEDICAL HISTORY     Past Medical History:   Diagnosis Date    Contact dermatitis and eczema due to cause          SURGICAL HISTORY     No past surgical history on file.      CURRENT MEDICATIONS       Previous Medications    CETIRIZINE (ZYRTEC) 10 MG TABLET    TAKE 1 TABLET BY MOUTH EVERY DAY    ESCITALOPRAM (LEXAPRO) 5 MG TABLET    TAKE 1 TABLET BY MOUTH EVERY DAY    FLUTICASONE (FLONASE) 50 MCG/ACT NASAL SPRAY    SPRAY 1 SPRAY INTO EACH NOSTRIL EVERY DAY    JUNEL FE 1.5/30 1.5-30 MG-MCG TABLET    Take 1 tablet by mouth daily    TRIAMCINOLONE (KENALOG) 0.1 % OINTMENT    Apply topically 2 times daily to affected after.  Do not use for >2 weeks consecutively.       ALLERGIES     Azithromycin    FAMILY HISTORY       Family History   Problem Relation Age of Onset    Cancer Maternal Aunt         Cancer    Breast Cancer Maternal Aunt     Migraines Mother     Cancer Maternal Grandmother     Cancer Maternal Grandfather     Cancer Maternal Aunt         Cancer    Breast Cancer Maternal Aunt           SOCIAL HISTORY       Social History     Socioeconomic History    Marital status: Single   Tobacco Use    Smoking status: Never    Smokeless tobacco: Never   Substance and Sexual Activity    Alcohol use: Never    Drug use: Never    Sexual activity: Not Currently     Partners: Male     Birth control/protection: Pill     Social Drivers of Health     Food Insecurity: No Food Insecurity (3/11/2025)    Hunger Vital Sign     Worried About  Statement Selected

## 2025-04-15 NOTE — ED NOTES
Report given to [facility]. [facility] transporting pt to [facility]

## 2025-04-15 NOTE — CONSULTS
[facility]    [address] [address]          GASTROENTEROLOGY CONSULTATION NOTE  Jaye Harrison PA-C  [phone] office  NP/PA in-hospital M-F until 4:30PM  After 5PM or on weekends, please call  for physician on call        NAME:  Chantelle Fernando   :   2005   MRN:   [id number]       Referring Physician: Ronald    Consult Date: 4/15/2025 11:30 AM    Chief Complaint: possible hepatitis     History of Present Illness:  Patient is a 19 y.o. who is seen in consultation at the request of Dr. Cardenas for possible hepatitis. The patient has a history of generalized anxiety disorder, oral contraceptive use. [facility] including swollen tonsils where she was prescribed penicillin and a steroid, was instructed to seek ER care if no improvement. She was on this regimen for 2-3 days without change, and presented to [facility]. Reports yellowing of her eyes and dark colored urine. Endorses some epigastric pain. No nausea or vomiting. Endorses dysphagia. No changes in stool, mentions they have been floating recently. No hematochezia or melena. No EGD/colonoscopy history. No new medications. No regular tylenol or NSAID use. No tobacco use. No etoh use. No previous liver issues.     I have reviewed the emergency room note, hospital admission note, notes by all other clinicians who have seen the patient during this hospitalization to date. I have reviewed the problem list and the reason for this hospitalization. I have reviewed the allergies and the medications the patient was taking at home prior to this hospitalization.    PMH:  Past Medical History:   Diagnosis Date    Contact dermatitis and eczema due to cause        PSH:  No past surgical history on file.    Allergies:  Allergies   Allergen Reactions    Azithromycin Rash       Home Medications:  Prior to Admission Medications   Prescriptions Last Dose Informant  Patient Reported? Taking?   JUNEL FE 1.5/30 1.5-30 MG-MCG tablet   Yes No   Sig: Take 1 tablet by mouth daily   cetirizine (ZYRTEC) 10 MG tablet   No No   Sig: TAKE 1 TABLET BY MOUTH EVERY DAY   escitalopram (LEXAPRO) 5 MG tablet   No No   Sig: TAKE 1 TABLET BY MOUTH EVERY DAY   fluticasone (FLONASE) 50 MCG/ACT nasal spray   No No   Sig: SPRAY 1 SPRAY INTO EACH NOSTRIL EVERY DAY   triamcinolone (KENALOG) 0.1 % ointment   No No   Sig: Apply topically 2 times daily to affected after.  Do not use for >2 weeks consecutively.      Facility-Administered Medications: None       Hospital Medications:  Current Facility-Administered Medications   Medication Dose Route Frequency    morphine (PF) injection 2 mg  2 mg IntraVENous Q4H PRN    clindamycin (CLEOCIN) 900 mg in dextrose 5 % 50 mL IVPB  900 mg IntraVENous Q8H    sodium chloride flush 0.9 % injection 5-40 mL  5-40 mL IntraVENous 2 times per day    sodium chloride flush 0.9 % injection 5-40 mL  5-40 mL IntraVENous PRN    0.9 % sodium chloride infusion   IntraVENous PRN    potassium chloride (KLOR-CON) extended release tablet 40 mEq  40 mEq Oral PRN    Or    potassium bicarb-citric acid (EFFER-K) effervescent tablet 40 mEq  40 mEq Oral PRN    Or    potassium chloride 10 mEq/100 mL IVPB (Peripheral Line)  10 mEq IntraVENous PRN    magnesium sulfate 2000 mg in 50 mL IVPB premix  2,000 mg IntraVENous PRN    ondansetron (ZOFRAN-ODT) disintegrating tablet 4 mg  4 mg Oral Q8H PRN    Or    ondansetron (ZOFRAN) injection 4 mg  4 mg IntraVENous Q6H PRN    polyethylene glycol (GLYCOLAX) packet 17 g  17 g Oral Daily PRN    lactated ringers infusion   IntraVENous Continuous    escitalopram (LEXAPRO) tablet 5 mg  5 mg Oral Daily    fluticasone (FLONASE) 50 MCG/ACT nasal spray 1 spray  1 spray Each Nostril Daily    cetirizine (ZYRTEC) tablet 10 mg  10 mg Oral Daily    dexAMETHasone (PF) (DECADRON) injection 6 mg  6 mg IntraVENous Q24H     Current Outpatient Medications   Medication Sig

## 2025-04-15 NOTE — ED TRIAGE NOTES
Pt arrives via [facility] via transfer from [place].    Pt reports dark colored orange urine x5 days, spots on tonsils, and jaundice.     Pt being transferred to have consult with hepatology due to elevated liver enzymes.

## 2025-04-15 NOTE — CONSULTS
OTOLARYNGOLOGY - HEAD AND NECK SURGERY HISTORY AND PHYSICAL    CC:   Throat pain    HPI:     Chantelle Fernando is a 19 y.o. female with tonsillitis.  About 5 days ago she started to have sore throat.  Went to walk in clinic.  Negative for strep.  Symptoms worsened so placed on PCN and then a steroid.  Still worse.  Went to ER.  Found to have elevated LFTs.  CT showed possible left tonsil abscess.  Started on IV clindamycin.  Perhaps a little better now.    Past Medical History:   Diagnosis Date    Contact dermatitis and eczema due to cause      No past surgical history on file.  Current Facility-Administered Medications   Medication Dose Route Frequency    morphine (PF) injection 2 mg  2 mg IntraVENous Q4H PRN    clindamycin (CLEOCIN) 900 mg in dextrose 5 % 50 mL IVPB  900 mg IntraVENous Q8H    sodium chloride flush 0.9 % injection 5-40 mL  5-40 mL IntraVENous 2 times per day    sodium chloride flush 0.9 % injection 5-40 mL  5-40 mL IntraVENous PRN    0.9 % sodium chloride infusion   IntraVENous PRN    potassium chloride (KLOR-CON) extended release tablet 40 mEq  40 mEq Oral PRN    Or    potassium bicarb-citric acid (EFFER-K) effervescent tablet 40 mEq  40 mEq Oral PRN    Or    potassium chloride 10 mEq/100 mL IVPB (Peripheral Line)  10 mEq IntraVENous PRN    magnesium sulfate 2000 mg in 50 mL IVPB premix  2,000 mg IntraVENous PRN    ondansetron (ZOFRAN-ODT) disintegrating tablet 4 mg  4 mg Oral Q8H PRN    Or    ondansetron (ZOFRAN) injection 4 mg  4 mg IntraVENous Q6H PRN    polyethylene glycol (GLYCOLAX) packet 17 g  17 g Oral Daily PRN    lactated ringers infusion   IntraVENous Continuous    escitalopram (LEXAPRO) tablet 5 mg  5 mg Oral Daily    fluticasone (FLONASE) 50 MCG/ACT nasal spray 1 spray  1 spray Each Nostril Daily    cetirizine (ZYRTEC) tablet 10 mg  10 mg Oral Daily    dexAMETHasone (PF) (DECADRON) injection 6 mg  6 mg IntraVENous Q24H     Current Outpatient Medications   Medication Sig    escitalopram

## 2025-04-16 LAB
ALBUMIN SERPL-MCNC: 2.6 G/DL (ref 3.5–5)
ALBUMIN/GLOB SERPL: 0.7 (ref 1.1–2.2)
ALP SERPL-CCNC: 87 U/L (ref 45–117)
ALT SERPL-CCNC: 350 U/L (ref 12–78)
ANION GAP SERPL CALC-SCNC: 3 MMOL/L (ref 2–12)
AST SERPL-CCNC: 184 U/L (ref 15–37)
BASOPHILS # BLD: 0 K/UL (ref 0–0.1)
BASOPHILS NFR BLD: 0 % (ref 0–1)
BILIRUB SERPL-MCNC: 4.2 MG/DL (ref 0.2–1)
BUN SERPL-MCNC: 5 MG/DL (ref 6–20)
BUN/CREAT SERPL: 7 (ref 12–20)
CALCIUM SERPL-MCNC: 8.2 MG/DL (ref 8.5–10.1)
CENTROMERE B AB SER-ACNC: <0.2 AI (ref 0–0.9)
CHLORIDE SERPL-SCNC: 104 MMOL/L (ref 97–108)
CHROMATIN AB SERPL-ACNC: <0.2 AI (ref 0–0.9)
CMV IGG SERPL IA-ACNC: <0.6 U/ML (ref 0–0.59)
CMV IGM SERPL IA-ACNC: <30 AU/ML (ref 0–29.9)
CO2 SERPL-SCNC: 28 MMOL/L (ref 21–32)
COXSACKIE A7 IGM: NEGATIVE TITER
CREAT SERPL-MCNC: 0.74 MG/DL (ref 0.55–1.02)
CV A16 IGG TITR SER IF: ABNORMAL TITER
CV A16 IGM TITR SER IF: NEGATIVE TITER
CV A24 IGG TITR SER IF: ABNORMAL TITER
CV A24 IGM TITR SER IF: NEGATIVE TITER
CV A7 IGG TITR SER IF: ABNORMAL TITER
CV A9 IGG TITR SER IF: ABNORMAL TITER
CV A9 IGM TITR SER IF: NEGATIVE TITER
DIFFERENTIAL METHOD BLD: ABNORMAL
DSDNA AB SER-ACNC: 1 IU/ML (ref 0–9)
ENA JO1 AB SER-ACNC: <0.2 AI (ref 0–0.9)
ENA RNP AB SER-ACNC: 5.1 AI (ref 0–0.9)
ENA SCL70 AB SER-ACNC: <0.2 AI (ref 0–0.9)
ENA SM AB SER-ACNC: <0.2 AI (ref 0–0.9)
ENA SS-A AB SER-ACNC: <0.2 AI (ref 0–0.9)
ENA SS-B AB SER-ACNC: <0.2 AI (ref 0–0.9)
EOSINOPHIL # BLD: 0 K/UL (ref 0–0.4)
EOSINOPHIL NFR BLD: 0 % (ref 0–7)
ERYTHROCYTE [DISTWIDTH] IN BLOOD BY AUTOMATED COUNT: 13.1 % (ref 11.5–14.5)
GLOBULIN SER CALC-MCNC: 3.9 G/DL (ref 2–4)
GLUCOSE SERPL-MCNC: 97 MG/DL (ref 65–100)
HCT VFR BLD AUTO: 32.8 % (ref 35–47)
HGB BLD-MCNC: 11 G/DL (ref 11.5–16)
IMM GRANULOCYTES # BLD AUTO: 0 K/UL
IMM GRANULOCYTES NFR BLD AUTO: 0 %
LYMPHOCYTES # BLD: 6.58 K/UL (ref 0.8–3.5)
LYMPHOCYTES NFR BLD: 70 % (ref 12–49)
Lab: ABNORMAL
MCH RBC QN AUTO: 30.1 PG (ref 26–34)
MCHC RBC AUTO-ENTMCNC: 33.5 G/DL (ref 30–36.5)
MCV RBC AUTO: 89.9 FL (ref 80–99)
MONOCYTES # BLD: 0.56 K/UL (ref 0–1)
MONOCYTES NFR BLD: 6 % (ref 5–13)
NEUTS SEG # BLD: 2.26 K/UL (ref 1.8–8)
NEUTS SEG NFR BLD: 24 % (ref 32–75)
NRBC # BLD: 0 K/UL (ref 0–0.01)
NRBC BLD-RTO: 0 PER 100 WBC
PLATELET # BLD AUTO: 165 K/UL (ref 150–400)
PMV BLD AUTO: 9.7 FL (ref 8.9–12.9)
POTASSIUM SERPL-SCNC: 3.5 MMOL/L (ref 3.5–5.1)
PROT SERPL-MCNC: 6.5 G/DL (ref 6.4–8.2)
RBC # BLD AUTO: 3.65 M/UL (ref 3.8–5.2)
RBC MORPH BLD: ABNORMAL
SODIUM SERPL-SCNC: 135 MMOL/L (ref 136–145)
WBC # BLD AUTO: 9.4 K/UL (ref 3.6–11)
WBC MORPH BLD: ABNORMAL

## 2025-04-16 PROCEDURE — 36415 COLL VENOUS BLD VENIPUNCTURE: CPT

## 2025-04-16 PROCEDURE — 2580000003 HC RX 258: Performed by: STUDENT IN AN ORGANIZED HEALTH CARE EDUCATION/TRAINING PROGRAM

## 2025-04-16 PROCEDURE — 2500000003 HC RX 250 WO HCPCS: Performed by: STUDENT IN AN ORGANIZED HEALTH CARE EDUCATION/TRAINING PROGRAM

## 2025-04-16 PROCEDURE — 85025 COMPLETE CBC W/AUTO DIFF WBC: CPT

## 2025-04-16 PROCEDURE — 6360000002 HC RX W HCPCS: Performed by: STUDENT IN AN ORGANIZED HEALTH CARE EDUCATION/TRAINING PROGRAM

## 2025-04-16 PROCEDURE — 1100000000 HC RM PRIVATE

## 2025-04-16 PROCEDURE — 6370000000 HC RX 637 (ALT 250 FOR IP)

## 2025-04-16 PROCEDURE — 6370000000 HC RX 637 (ALT 250 FOR IP): Performed by: STUDENT IN AN ORGANIZED HEALTH CARE EDUCATION/TRAINING PROGRAM

## 2025-04-16 PROCEDURE — 80053 COMPREHEN METABOLIC PANEL: CPT

## 2025-04-16 RX ORDER — POLYETHYLENE GLYCOL 3350 17 G/17G
17 POWDER, FOR SOLUTION ORAL DAILY
Status: DISCONTINUED | OUTPATIENT
Start: 2025-04-16 | End: 2025-04-17

## 2025-04-16 RX ADMIN — CLINDAMYCIN PHOSPHATE 900 MG: 900 INJECTION, SOLUTION INTRAVENOUS at 08:38

## 2025-04-16 RX ADMIN — MORPHINE SULFATE 2 MG: 2 INJECTION, SOLUTION INTRAMUSCULAR; INTRAVENOUS at 12:24

## 2025-04-16 RX ADMIN — MORPHINE SULFATE 2 MG: 2 INJECTION, SOLUTION INTRAMUSCULAR; INTRAVENOUS at 04:07

## 2025-04-16 RX ADMIN — SODIUM CHLORIDE, PRESERVATIVE FREE 10 ML: 5 INJECTION INTRAVENOUS at 08:39

## 2025-04-16 RX ADMIN — MORPHINE SULFATE 2 MG: 2 INJECTION, SOLUTION INTRAMUSCULAR; INTRAVENOUS at 07:57

## 2025-04-16 RX ADMIN — CLINDAMYCIN PHOSPHATE 900 MG: 900 INJECTION, SOLUTION INTRAVENOUS at 18:09

## 2025-04-16 RX ADMIN — MORPHINE SULFATE 2 MG: 2 INJECTION, SOLUTION INTRAMUSCULAR; INTRAVENOUS at 20:27

## 2025-04-16 RX ADMIN — MORPHINE SULFATE 2 MG: 2 INJECTION, SOLUTION INTRAMUSCULAR; INTRAVENOUS at 16:29

## 2025-04-16 RX ADMIN — POLYETHYLENE GLYCOL 3350 17 G: 17 POWDER, FOR SOLUTION ORAL at 15:24

## 2025-04-16 RX ADMIN — SODIUM CHLORIDE: 0.9 INJECTION, SOLUTION INTRAVENOUS at 18:09

## 2025-04-16 RX ADMIN — CLINDAMYCIN PHOSPHATE 900 MG: 900 INJECTION, SOLUTION INTRAVENOUS at 02:20

## 2025-04-16 RX ADMIN — ESCITALOPRAM OXALATE 5 MG: 10 TABLET ORAL at 08:32

## 2025-04-16 RX ADMIN — CETIRIZINE HYDROCHLORIDE 10 MG: 10 TABLET, FILM COATED ORAL at 08:32

## 2025-04-16 RX ADMIN — FLUTICASONE PROPIONATE 1 SPRAY: 50 SPRAY, METERED NASAL at 08:30

## 2025-04-16 ASSESSMENT — PAIN SCALES - GENERAL
PAINLEVEL_OUTOF10: 9
PAINLEVEL_OUTOF10: 7
PAINLEVEL_OUTOF10: 5
PAINLEVEL_OUTOF10: 9

## 2025-04-16 ASSESSMENT — PAIN DESCRIPTION - LOCATION: LOCATION: THROAT

## 2025-04-16 ASSESSMENT — PAIN DESCRIPTION - ORIENTATION: ORIENTATION: LEFT

## 2025-04-16 ASSESSMENT — PAIN DESCRIPTION - DESCRIPTORS
DESCRIPTORS: ACHING;DISCOMFORT
DESCRIPTORS: DISCOMFORT;SORE

## 2025-04-16 NOTE — ED NOTES
Verbal shift change report given to WILL Willson (oncoming nurse) by WILL Dove (offgoing nurse). Report included the following information Nurse Handoff Report, Index, ED Encounter Summary, ED SBAR, Adult Overview, MAR, and Recent Results.

## 2025-04-16 NOTE — PROGRESS NOTES
[facility]   [address] [address]       GI PROGRESS NOTE  Jaye Harrison PA-C  [phone] office  NP/PA in-hospital M-F until 4:30PM  After 5PM or on weekends, please call  for physician on call      NAME: Chantelle Fernando   :  2005   MRN:  [id number]       Subjective:     Patient resting in bed, feeling well today but still having tonsil pain. Clarified previous new medications: PT took Penicillin and steroid pack. No other new medications prior to onset of symptoms. Discussed hepatitis panel negative. Has not passed stool recently    Objective:     VITALS:   Last 24hrs VS reviewed since prior progress note. Most recent are:  Vitals:    25 0915   BP: (!) 105/59   Pulse: 91   Resp: 16   Temp: 97.7 °F (36.5 °C)   SpO2: 97%       PHYSICAL EXAM:  General: Cooperative, no acute distress    Neurologic:  Alert and oriented X 3.  HEENT: EOMI, no scleral icterus   Lungs:  CTA bilaterally. No wheezing  Heart:  S1 S2, regular rhythm  Abdomen: Soft, non-distended, mild epigastric tenderness. +Bowel sounds  Extremities: No edema  Psych:   Good insight. Not anxious or agitated.    Lab Data Reviewed:     Recent Results (from the past 24 hours)   Comprehensive Metabolic Panel    Collection Time: 25  2:25 AM   Result Value Ref Range    Sodium 135 (L) 136 - 145 mmol/L    Potassium 3.5 3.5 - 5.1 mmol/L    Chloride 104 97 - 108 mmol/L    CO2 28 21 - 32 mmol/L    Anion Gap 3 2 - 12 mmol/L    Glucose 97 65 - 100 mg/dL    BUN 5 (L) 6 - 20 MG/DL    Creatinine 0.74 0.55 - 1.02 MG/DL    BUN/Creatinine Ratio 7 (L) 12 - 20      Est, Glom Filt Rate >90 >60 ml/min/1.73m2    Calcium 8.2 (L) 8.5 - 10.1 MG/DL    Total Bilirubin 4.2 (H) 0.2 - 1.0 MG/DL     (H) 12 - 78 U/L     (H) 15 - 37 U/L    Alk Phosphatase 87 45 - 117 U/L    Total Protein 6.5 6.4 - 8.2 g/dL    Albumin 2.6 (L) 3.5 - 5.0 g/dL    Globulin 3.9 2.0 - 4.0 g/dL    Albumin/Globulin Ratio 0.7 (L) 1.1 - 2.2

## 2025-04-16 NOTE — PLAN OF CARE
Problem: Safety - Adult  Goal: Free from fall injury  Outcome: Progressing  Flowsheets (Taken 4/16/2025 0900)  Free From Fall Injury: Instruct family/caregiver on patient safety     Problem: Skin/Tissue Integrity - Adult  Goal: Skin integrity remains intact  Outcome: Progressing  Flowsheets (Taken 4/16/2025 0900)  Skin Integrity Remains Intact:   Monitor for areas of redness and/or skin breakdown   Assess vascular access sites hourly     Problem: Skin/Tissue Integrity - Adult  Goal: Incisions, wounds, or drain sites healing without S/S of infection  Outcome: Progressing  Flowsheets (Taken 4/16/2025 0900)  Incisions, Wounds, or Drain Sites Healing Without Sign and Symptoms of Infection: ADMISSION and DAILY: Assess and document risk factors for pressure ulcer development

## 2025-04-16 NOTE — PROGRESS NOTES
ED TO INPATIENT SBAR HANDOFF    Patient Name: Chantelle Fernando   :  2005  19 y.o.   MRN:  [id number]  ED Room #:  ER25/25     Situation  Code Status: Full Code   Allergies: Azithromycin  Weight: Patient Vitals for the past 96 hrs (Last 3 readings):   Weight   04/15/25 0800 66.9 kg (147 lb 7.8 oz)   25 2226 61.8 kg (136 lb 3.9 oz)       Arrived from: home    Chief Complaint:   Chief Complaint   Patient presents with    Jaundice       Hospital Problem/Diagnosis:  Principal Problem:    Acute hepatitis  Resolved Problems:    * No resolved hospital problems. *      Mobility: no current mobility problem   ED Fall Risk: Presents to emergency department  because of falls (Syncope, seizure, or loss of consciousness): No, Age > 70: No, Altered Mental Status, Intoxication with alcohol or substance confusion (Disorientation, impaired judgment, poor safety awaremess, or inability to follow instructions): No, Impaired Mobility: Ambulates or transfers with assistive devices or assistance; Unable to ambulate or transer.: No, Nursing Judgement: No   Fell in ED or prior to admission: no   Restraints: no     Sitter: no   Family/Caregiver Present: no    Neet to know social/safety information: na    Background  History:   Past Medical History:   Diagnosis Date    Contact dermatitis and eczema due to cause        Assessment    Abnormal Assessment Findings: na    Imaging:   No orders to display     Abnormal labs:   Abnormal Labs Reviewed   C-REACTIVE PROTEIN - Abnormal; Notable for the following components:       Result Value    CRP 0.85 (*)     All other components within normal limits   COMPREHENSIVE METABOLIC PANEL - Abnormal; Notable for the following components:    Potassium 3.3 (*)     BUN/Creatinine Ratio 10 (*)     Total Bilirubin 4.7 (*)      (*)      (*)     Albumin 2.8 (*)     Globulin 4.1 (*)     Albumin/Globulin Ratio 0.7 (*)     All other components within normal limits   CBC WITH AUTO DIFFERENTIAL  completed, amount 1000ml    VS x 2 post-fluid resuscitation: YES    Recommendation    Plan for next 24 hours:   IV ABX  Additional Comments: NONE     Isolation:Patient no longer requires any isolation precautions.  Pending orders: none  Consults ordered: IP CONSULT TO GI  IP CONSULT TO OTOLARYNGOLOGY    Consulted provider:      If any further questions, please call Sending RN at [phone]  Electronically signed by: Electronically signed by Mat Wang RN on 4/15/2025 at 1010PM

## 2025-04-16 NOTE — CARE COORDINATION
Care Management Initial Assessment       RUR:7%  Readmission? No  1st IM letter given? N/A  1st  letter given: N/A    Transition of Care Plan:    RUR: 7%  Prior Level of Functioning: independent  Disposition: return to independent functioning  CYNDI: 4/18/25  If SNF or IPR: Date FOC offered:   Date FOC received:   Accepting facility:   Date authorization started with reference number:   Date authorization received and expires:   Follow up appointments:   DME needed: None  Transportation at discharge: family  IM/IMM Medicare/ letter given: N/A  Is patient a  and connected with [facility]?    If yes, was  transfer form completed and [facility] notified?   Caregiver Contact: father Rusty Fernando  Discharge Caregiver contacted prior to discharge?   Care Conference needed?   Barriers to discharge: .    Reason for Admission: Acute hepatitis       Hyperlipidemia       Cholestatic jaundice       Left tonsillar/peritonsillar abscess       Generalized anxiety disorder      Consults:      GI, ENT              Plan for utilizing home health:   None       PCP: First and Last name:  Elizabeth Eduardo APRN - CNP   Name of Practice:    Are you a current patient: Yes    Approximate date of last visit: one week ago    Current Advanced Directive/Advance Care Plan: Full Code                   CM met with patient and her parents with whom she lives. Patient lives in a  2 story house. Local family support includes brother. Patient was ambulatory prior to admission. Patient confirmed PCP, health insurance, and prescription coverage.   Previous home health :None  Previous IPR/ SNF rehab :None  DME :None     04/16/25 1413   Service Assessment   Patient Orientation Alert and Oriented   Cognition Alert   History Provided By Patient   Primary Caregiver Family   Accompanied By/Relationship parents   Support Systems Family Members;Parent   Patient's Healthcare Decision Maker is: Legal Next of Kin   PCP Verified by CM Yes   Last Visit  to PCP Within last 3 months   Prior Functional Level Independent in ADLs/IADLs   Current Functional Level Independent in ADLs/IADLs   Can patient return to prior living arrangement Yes   Ability to make needs known: Good   Family able to assist with home care needs: Yes   Would you like for me to discuss the discharge plan with any other family members/significant others, and if so, who? No   Financial Resources None   Community Resources None

## 2025-04-16 NOTE — PLAN OF CARE
2000 Report received from WILL Dove. Patient alert and oriented, resting in bed and family in room. No needs expressed and call bell within reach.    Shift summary.    No acute events noted. Pt resting in bed comfortably, call bell left within reach.    2230 Bedside shift change report given to WILL Gorman by WILL Rivero. Report included the following information SBAR, Kardex, MAR, Accordion, and Recent Results.    Problem: Safety - Adult  Goal: Free from fall injury  Outcome: Progressing     Problem: Skin/Tissue Integrity - Adult  Goal: Skin integrity remains intact  Outcome: Progressing     Problem: Gastrointestinal - Adult  Goal: Minimal or absence of nausea and vomiting  Outcome: Progressing  Goal: Maintains or returns to baseline bowel function  Outcome: Progressing  Goal: Maintains adequate nutritional intake  Outcome: Progressing  Goal: Establish and maintain optimal ostomy function  Outcome: Progressing

## 2025-04-16 NOTE — PROGRESS NOTES
[facility]                                                                                          Hospitalist Progress Note  April Shaffer PA-C  Answering service: [phone] OR [phone] from in house phone        Date of Service:  2025  NAME:  Chantelle Fernando  :  2005  MRN:  [id number]       Admission Summary:   Chantelle Fernando is a 19 y.o. female with history of generalized anxiety disorder, oral contraceptive use who presented to [facility] ED on 2025 with concerns of painful and swollen left tonsil as well as noticing yellow discoloration of her and dark-colored urine. She reported near 1.5-week of progressive sore throat and left cervical lymphadenopathy.  She has had some progressive odynophagia. No recent travel and no known sick contacts. Currently works as a .  Denies IV drug use history.     The patient denied any fever, chills, chest or abdominal pain, nausea, vomiting, cough, congestion, recent illness, palpitations, or dysuria.  Remarkable vitals on ER Presentation: vss  Labs Remarkable for: T. bili 5.2, , .  UA: Cloudy, trace ketones, trace blood, nitrites, leukocyte esterase  ER Images: CT neck and soft tissue: Left tonsillar/peritonsillar abscess.  Enlarged left cervical and submandibular lymph nodes without central necrosis or separation.  Paranasal sinus inflammatory disease.  CT abdomen pelvis, RUQ US: No acute process  ER Rx: Unasyn, Toradol 30 mg  Interval history / Subjective:   Patient seen and examined on rounds in conjunction with family at bedside.  Patient states she is feeling a little bit better today.  States she is eating soft foods and able to drink fluids.  Discussed monitoring the patient for 24 more hours in order to see LFTs continue to improve.  Patient and family in agreement.  Patient denies any abdominal pain, chest pain, shortness of breath, fevers and chills.   (CLEOCIN) 900 mg in dextrose 5 % 50 mL IVPB  900 mg IntraVENous Q8H    sodium chloride flush 0.9 % injection 5-40 mL  5-40 mL IntraVENous 2 times per day    sodium chloride flush 0.9 % injection 5-40 mL  5-40 mL IntraVENous PRN    0.9 % sodium chloride infusion   IntraVENous PRN    potassium chloride (KLOR-CON) extended release tablet 40 mEq  40 mEq Oral PRN    Or    potassium bicarb-citric acid (EFFER-K) effervescent tablet 40 mEq  40 mEq Oral PRN    Or    potassium chloride 10 mEq/100 mL IVPB (Peripheral Line)  10 mEq IntraVENous PRN    magnesium sulfate 2000 mg in 50 mL IVPB premix  2,000 mg IntraVENous PRN    ondansetron (ZOFRAN-ODT) disintegrating tablet 4 mg  4 mg Oral Q8H PRN    Or    ondansetron (ZOFRAN) injection 4 mg  4 mg IntraVENous Q6H PRN    polyethylene glycol (GLYCOLAX) packet 17 g  17 g Oral Daily PRN    escitalopram (LEXAPRO) tablet 5 mg  5 mg Oral Daily    fluticasone (FLONASE) 50 MCG/ACT nasal spray 1 spray  1 spray Each Nostril Daily    cetirizine (ZYRTEC) tablet 10 mg  10 mg Oral Daily     ______________________________________________________________________  EXPECTED LENGTH OF STAY: Unable to retrieve estimated LOS  ACTUAL LENGTH OF STAY:          1                 April Shaffer PA-C

## 2025-04-17 ENCOUNTER — APPOINTMENT (OUTPATIENT)
Facility: HOSPITAL | Age: 20
End: 2025-04-17
Payer: COMMERCIAL

## 2025-04-17 LAB
ALBUMIN SERPL-MCNC: 2.9 G/DL (ref 3.5–5)
ALBUMIN/GLOB SERPL: 0.7 (ref 1.1–2.2)
ALP SERPL-CCNC: 104 U/L (ref 45–117)
ALT SERPL-CCNC: 324 U/L (ref 12–78)
ANION GAP SERPL CALC-SCNC: 7 MMOL/L (ref 2–12)
AST SERPL-CCNC: 138 U/L (ref 15–37)
BASOPHILS # BLD: 0.09 K/UL (ref 0–0.1)
BASOPHILS NFR BLD: 1 % (ref 0–1)
BILIRUB DIRECT SERPL-MCNC: 3.8 MG/DL (ref 0–0.2)
BILIRUB INDIRECT SERPL-MCNC: 0.5 MG/DL (ref 0–1.1)
BILIRUB SERPL-MCNC: 4.3 MG/DL (ref 0.2–1)
BILIRUB SERPL-MCNC: 4.4 MG/DL (ref 0.2–1)
BUN SERPL-MCNC: 4 MG/DL (ref 6–20)
BUN/CREAT SERPL: 6 (ref 12–20)
CALCIUM SERPL-MCNC: 8.6 MG/DL (ref 8.5–10.1)
CHLORIDE SERPL-SCNC: 101 MMOL/L (ref 97–108)
CO2 SERPL-SCNC: 27 MMOL/L (ref 21–32)
CREAT SERPL-MCNC: 0.63 MG/DL (ref 0.55–1.02)
DIFFERENTIAL METHOD BLD: ABNORMAL
EBV INTERPRETATION: ABNORMAL
EBV NA IGG SER-ACNC: <18 U/ML (ref 0–17.9)
EBV VCA IGG SER-ACNC: 49.8 U/ML (ref 0–17.9)
EBV VCA IGM SER-ACNC: 116 U/ML (ref 0–35.9)
EOSINOPHIL # BLD: 0.09 K/UL (ref 0–0.4)
EOSINOPHIL NFR BLD: 1 % (ref 0–7)
ERYTHROCYTE [DISTWIDTH] IN BLOOD BY AUTOMATED COUNT: 13 % (ref 11.5–14.5)
GLOBULIN SER CALC-MCNC: 4.2 G/DL (ref 2–4)
GLUCOSE SERPL-MCNC: 113 MG/DL (ref 65–100)
HCT VFR BLD AUTO: 34.2 % (ref 35–47)
HGB BLD-MCNC: 11.5 G/DL (ref 11.5–16)
HHV6 DNA SPEC NAA+PROBE: NEGATIVE
IMM GRANULOCYTES # BLD AUTO: 0 K/UL
IMM GRANULOCYTES NFR BLD AUTO: 0 %
LYMPHOCYTES # BLD: 6.53 K/UL (ref 0.8–3.5)
LYMPHOCYTES NFR BLD: 71 % (ref 12–49)
MCH RBC QN AUTO: 29.5 PG (ref 26–34)
MCHC RBC AUTO-ENTMCNC: 33.6 G/DL (ref 30–36.5)
MCV RBC AUTO: 87.7 FL (ref 80–99)
MONOCYTES # BLD: 0.74 K/UL (ref 0–1)
MONOCYTES NFR BLD: 8 % (ref 5–13)
NEUTS BAND NFR BLD MANUAL: 3 % (ref 0–6)
NEUTS SEG # BLD: 1.75 K/UL (ref 1.8–8)
NEUTS SEG NFR BLD: 16 % (ref 32–75)
NRBC # BLD: 0 K/UL (ref 0–0.01)
NRBC BLD-RTO: 0 PER 100 WBC
PATH REV BLD -IMP: ABNORMAL
PLATELET # BLD AUTO: 203 K/UL (ref 150–400)
PMV BLD AUTO: 10.1 FL (ref 8.9–12.9)
POTASSIUM SERPL-SCNC: 3.6 MMOL/L (ref 3.5–5.1)
PROT SERPL-MCNC: 7.1 G/DL (ref 6.4–8.2)
RBC # BLD AUTO: 3.9 M/UL (ref 3.8–5.2)
RBC MORPH BLD: ABNORMAL
SODIUM SERPL-SCNC: 135 MMOL/L (ref 136–145)
SPECIMEN SOURCE: NORMAL
WBC # BLD AUTO: 9.2 K/UL (ref 3.6–11)
WBC MORPH BLD: ABNORMAL

## 2025-04-17 PROCEDURE — 6360000002 HC RX W HCPCS

## 2025-04-17 PROCEDURE — 6360000002 HC RX W HCPCS: Performed by: STUDENT IN AN ORGANIZED HEALTH CARE EDUCATION/TRAINING PROGRAM

## 2025-04-17 PROCEDURE — 82248 BILIRUBIN DIRECT: CPT

## 2025-04-17 PROCEDURE — 2500000003 HC RX 250 WO HCPCS: Performed by: STUDENT IN AN ORGANIZED HEALTH CARE EDUCATION/TRAINING PROGRAM

## 2025-04-17 PROCEDURE — 6370000000 HC RX 637 (ALT 250 FOR IP)

## 2025-04-17 PROCEDURE — 85025 COMPLETE CBC W/AUTO DIFF WBC: CPT

## 2025-04-17 PROCEDURE — 6370000000 HC RX 637 (ALT 250 FOR IP): Performed by: STUDENT IN AN ORGANIZED HEALTH CARE EDUCATION/TRAINING PROGRAM

## 2025-04-17 PROCEDURE — A9579 GAD-BASE MR CONTRAST NOS,1ML: HCPCS | Performed by: NURSE PRACTITIONER

## 2025-04-17 PROCEDURE — 80053 COMPREHEN METABOLIC PANEL: CPT

## 2025-04-17 PROCEDURE — 6360000004 HC RX CONTRAST MEDICATION: Performed by: NURSE PRACTITIONER

## 2025-04-17 PROCEDURE — 82247 BILIRUBIN TOTAL: CPT

## 2025-04-17 PROCEDURE — 74183 MRI ABD W/O CNTR FLWD CNTR: CPT

## 2025-04-17 PROCEDURE — 1100000000 HC RM PRIVATE

## 2025-04-17 RX ORDER — SODIUM CHLORIDE 0.9 % (FLUSH) 0.9 %
5-40 SYRINGE (ML) INJECTION 2 TIMES DAILY
Status: DISCONTINUED | OUTPATIENT
Start: 2025-04-17 | End: 2025-04-18 | Stop reason: HOSPADM

## 2025-04-17 RX ORDER — POLYETHYLENE GLYCOL 3350 17 G/17G
17 POWDER, FOR SOLUTION ORAL 2 TIMES DAILY
Status: DISCONTINUED | OUTPATIENT
Start: 2025-04-17 | End: 2025-04-18 | Stop reason: HOSPADM

## 2025-04-17 RX ORDER — BISACODYL 5 MG/1
5 TABLET, DELAYED RELEASE ORAL DAILY
Status: DISCONTINUED | OUTPATIENT
Start: 2025-04-17 | End: 2025-04-18 | Stop reason: HOSPADM

## 2025-04-17 RX ORDER — LORAZEPAM 2 MG/ML
0.5 INJECTION INTRAMUSCULAR ONCE
Status: COMPLETED | OUTPATIENT
Start: 2025-04-17 | End: 2025-04-17

## 2025-04-17 RX ORDER — 0.9 % SODIUM CHLORIDE 0.9 %
100 INTRAVENOUS SOLUTION INTRAVENOUS ONCE
Status: DISCONTINUED | OUTPATIENT
Start: 2025-04-17 | End: 2025-04-18 | Stop reason: HOSPADM

## 2025-04-17 RX ORDER — SENNOSIDES A AND B 8.6 MG/1
1 TABLET, FILM COATED ORAL DAILY
Status: DISCONTINUED | OUTPATIENT
Start: 2025-04-17 | End: 2025-04-18 | Stop reason: HOSPADM

## 2025-04-17 RX ORDER — OXYCODONE HCL 5 MG/5 ML
5 SOLUTION, ORAL ORAL EVERY 4 HOURS PRN
Refills: 0 | Status: DISCONTINUED | OUTPATIENT
Start: 2025-04-17 | End: 2025-04-18 | Stop reason: HOSPADM

## 2025-04-17 RX ORDER — LIDOCAINE HYDROCHLORIDE 20 MG/ML
15 SOLUTION OROPHARYNGEAL
Status: DISCONTINUED | OUTPATIENT
Start: 2025-04-17 | End: 2025-04-18 | Stop reason: HOSPADM

## 2025-04-17 RX ADMIN — OXYCODONE HYDROCHLORIDE 5 MG: 5 SOLUTION ORAL at 21:30

## 2025-04-17 RX ADMIN — OXYCODONE HYDROCHLORIDE 5 MG: 5 SOLUTION ORAL at 17:36

## 2025-04-17 RX ADMIN — BISACODYL 5 MG: 5 TABLET, COATED ORAL at 12:33

## 2025-04-17 RX ADMIN — CLINDAMYCIN PHOSPHATE 900 MG: 900 INJECTION, SOLUTION INTRAVENOUS at 18:59

## 2025-04-17 RX ADMIN — MORPHINE SULFATE 2 MG: 2 INJECTION, SOLUTION INTRAMUSCULAR; INTRAVENOUS at 00:42

## 2025-04-17 RX ADMIN — MORPHINE SULFATE 2 MG: 2 INJECTION, SOLUTION INTRAMUSCULAR; INTRAVENOUS at 08:28

## 2025-04-17 RX ADMIN — CETIRIZINE HYDROCHLORIDE 10 MG: 10 TABLET, FILM COATED ORAL at 09:00

## 2025-04-17 RX ADMIN — POLYETHYLENE GLYCOL 3350 17 G: 17 POWDER, FOR SOLUTION ORAL at 23:14

## 2025-04-17 RX ADMIN — LIDOCAINE HYDROCHLORIDE 15 ML: 20 SOLUTION ORAL at 20:52

## 2025-04-17 RX ADMIN — ESCITALOPRAM OXALATE 5 MG: 10 TABLET ORAL at 09:00

## 2025-04-17 RX ADMIN — CLINDAMYCIN PHOSPHATE 900 MG: 900 INJECTION, SOLUTION INTRAVENOUS at 10:29

## 2025-04-17 RX ADMIN — POLYETHYLENE GLYCOL 3350 17 G: 17 POWDER, FOR SOLUTION ORAL at 09:00

## 2025-04-17 RX ADMIN — LORAZEPAM 0.5 MG: 2 INJECTION INTRAMUSCULAR; INTRAVENOUS at 22:03

## 2025-04-17 RX ADMIN — SENNOSIDES 8.6 MG: 8.6 TABLET, FILM COATED ORAL at 16:48

## 2025-04-17 RX ADMIN — CLINDAMYCIN PHOSPHATE 900 MG: 900 INJECTION, SOLUTION INTRAVENOUS at 00:42

## 2025-04-17 RX ADMIN — GADOTERIDOL 13 ML: 279.3 INJECTION, SOLUTION INTRAVENOUS at 22:47

## 2025-04-17 RX ADMIN — SODIUM CHLORIDE, PRESERVATIVE FREE 10 ML: 5 INJECTION INTRAVENOUS at 09:00

## 2025-04-17 RX ADMIN — LIDOCAINE HYDROCHLORIDE 15 ML: 20 SOLUTION ORAL at 17:42

## 2025-04-17 ASSESSMENT — PAIN DESCRIPTION - DESCRIPTORS
DESCRIPTORS: ACHING
DESCRIPTORS: ACHING
DESCRIPTORS: DISCOMFORT;SORE

## 2025-04-17 ASSESSMENT — PAIN SCALES - GENERAL
PAINLEVEL_OUTOF10: 7
PAINLEVEL_OUTOF10: 9

## 2025-04-17 ASSESSMENT — PAIN DESCRIPTION - LOCATION
LOCATION: THROAT

## 2025-04-17 ASSESSMENT — PAIN DESCRIPTION - ORIENTATION
ORIENTATION: LEFT
ORIENTATION: MID

## 2025-04-17 NOTE — PROGRESS NOTES
[facility]   [address] [address]       GI PROGRESS NOTE  Jaye Harrison PA-C  [phone] office  NP/PA in-hospital M-F until 4:30PM  After 5PM or on weekends, please call  for physician on call      NAME: Chantelle Fernando   :  2005   MRN:  [id number]       Subjective:     Patient resting in bed, feeling well today but still having tonsil pain. Discussed coxsackie results, still pending additional labs. PT still has not had a bowel movement.    Objective:     VITALS:   Last 24hrs VS reviewed since prior progress note. Most recent are:  Vitals:    25 0854   BP: 114/78   Pulse: 80   Resp: 16   Temp: 97.7 °F (36.5 °C)   SpO2: 97%       PHYSICAL EXAM:  General: Cooperative, no acute distress    Neurologic:  Alert and oriented X 3.  HEENT: EOMI, no scleral icterus   Lungs:  CTA bilaterally. No wheezing  Heart:  S1 S2, regular rhythm  Abdomen: Soft, non-distended, mild epigastric tenderness. +Bowel sounds  Extremities: No edema  Psych:   Good insight. Not anxious or agitated.    Lab Data Reviewed:     Recent Results (from the past 24 hours)   Comprehensive Metabolic Panel    Collection Time: 25  2:24 AM   Result Value Ref Range    Sodium 135 (L) 136 - 145 mmol/L    Potassium 3.6 3.5 - 5.1 mmol/L    Chloride 101 97 - 108 mmol/L    CO2 27 21 - 32 mmol/L    Anion Gap 7 2 - 12 mmol/L    Glucose 113 (H) 65 - 100 mg/dL    BUN 4 (L) 6 - 20 MG/DL    Creatinine 0.63 0.55 - 1.02 MG/DL    BUN/Creatinine Ratio 6 (L) 12 - 20      Est, Glom Filt Rate >90 >60 ml/min/1.73m2    Calcium 8.6 8.5 - 10.1 MG/DL    Total Bilirubin 4.4 (H) 0.2 - 1.0 MG/DL     (H) 12 - 78 U/L     (H) 15 - 37 U/L    Alk Phosphatase 104 45 - 117 U/L    Total Protein 7.1 6.4 - 8.2 g/dL    Albumin 2.9 (L) 3.5 - 5.0 g/dL    Globulin 4.2 (H) 2.0 - 4.0 g/dL    Albumin/Globulin Ratio 0.7 (L) 1.1 - 2.2     CBC with Auto Differential    Collection Time: 25  2:24 AM   Result Value Ref

## 2025-04-17 NOTE — PROGRESS NOTES
[facility]                                                                                          Hospitalist Progress Note  April Shaffer PA-C  Answering service: [phone] OR [phone] from in house phone        Date of Service:  2025  NAME:  Chantelle Fernando  :  2005  MRN:  [id number]       Admission Summary:   Chantelle Fernando is a 19 y.o. female with history of generalized anxiety disorder, oral contraceptive use who presented to [facility] ED on 2025 with concerns of painful and swollen left tonsil as well as noticing yellow discoloration of her and dark-colored urine. She reported near 1.5-week of progressive sore throat and left cervical lymphadenopathy.  She has had some progressive odynophagia. No recent travel and no known sick contacts. Currently works as a .  Denies IV drug use history.     The patient denied any fever, chills, chest or abdominal pain, nausea, vomiting, cough, congestion, recent illness, palpitations, or dysuria.  Remarkable vitals on ER Presentation: vss  Labs Remarkable for: T. bili 5.2, , .  UA: Cloudy, trace ketones, trace blood, nitrites, leukocyte esterase  ER Images: CT neck and soft tissue: Left tonsillar/peritonsillar abscess.  Enlarged left cervical and submandibular lymph nodes without central necrosis or separation.  Paranasal sinus inflammatory disease.  CT abdomen pelvis, RUQ US: No acute process  ER Rx: Unasyn, Toradol 30 mg  Interval history / Subjective:   Patient seen and examined on rounds in conjunction with family at bedside.  Patient states she is feeling a little bit better today.  States she has pain in her throat mainly. Endorses very mild abdominal pain in her upper quadrants due to constipation. Bowel regimen added to patient MAR. Discussed coxsackievirus with patient and family, expressed understanding. Per GI, MRCP ordered, will keep patient until

## 2025-04-17 NOTE — PLAN OF CARE
Problem: Safety - Adult  Goal: Free from fall injury  4/17/2025 1009 by Heena Sanchez RN  Outcome: Progressing  4/16/2025 2115 by Sherif Segura RN  Outcome: Progressing     Problem: Skin/Tissue Integrity - Adult  Goal: Skin integrity remains intact  4/17/2025 1009 by Heena Sanchez RN  Outcome: Progressing  4/16/2025 2115 by Sherif Segura RN  Outcome: Progressing  Goal: Incisions, wounds, or drain sites healing without S/S of infection  4/17/2025 1009 by Heena Sanchez RN  Outcome: Progressing  4/16/2025 2115 by Sherif Segura RN  Outcome: Progressing  Goal: Oral mucous membranes remain intact  4/17/2025 1009 by Heena Sanchez RN  Outcome: Progressing  4/16/2025 2115 by Sherif Segura RN  Outcome: Progressing     Problem: Gastrointestinal - Adult  Goal: Minimal or absence of nausea and vomiting  4/17/2025 1009 by Heena Sanchez RN  Outcome: Progressing  4/16/2025 2115 by Sherif Segura RN  Outcome: Progressing  Flowsheets (Taken 4/16/2025 0900 by Lili Holbrook LPN)  Minimal or absence of nausea and vomiting: Administer IV fluids as ordered to ensure adequate hydration  Goal: Maintains or returns to baseline bowel function  4/17/2025 1009 by Heena Sanchez RN  Outcome: Progressing  4/16/2025 2115 by Sherif Segura RN  Outcome: Progressing  Goal: Maintains adequate nutritional intake  4/17/2025 1009 by Heena Sanchez RN  Outcome: Progressing  4/16/2025 2115 by Sherif Segura RN  Outcome: Progressing  Goal: Establish and maintain optimal ostomy function  4/17/2025 1009 by Heena Sanchez RN  Outcome: Progressing  4/16/2025 2115 by Sherif Segura RN  Outcome: Progressing     Problem: Discharge Planning  Goal: Discharge to home or other facility with appropriate resources  4/17/2025 1009 by Heena Sanchez RN  Outcome: Progressing  4/16/2025 2115 by Sherif Segura RN  Outcome: Progressing  Flowsheets (Taken 4/16/2025 0900 by Lili Holbrook,

## 2025-04-17 NOTE — PROGRESS NOTES
A Spiritual Care Partner Volunteer visited Chantelle Fernando at [facility] in [facility] 5W1 ORTHO SPINE on 4/17/2025     Documented by: Chaplain Monserrat Smith

## 2025-04-18 VITALS
OXYGEN SATURATION: 98 % | TEMPERATURE: 98 F | HEART RATE: 96 BPM | DIASTOLIC BLOOD PRESSURE: 72 MMHG | WEIGHT: 147.49 LBS | BODY MASS INDEX: 26.13 KG/M2 | RESPIRATION RATE: 16 BRPM | SYSTOLIC BLOOD PRESSURE: 113 MMHG | HEIGHT: 63 IN

## 2025-04-18 LAB
ALBUMIN SERPL-MCNC: 3.1 G/DL (ref 3.5–5)
ALBUMIN/GLOB SERPL: 0.7 (ref 1.1–2.2)
ALP SERPL-CCNC: 115 U/L (ref 45–117)
ALT SERPL-CCNC: 331 U/L (ref 12–78)
ANION GAP SERPL CALC-SCNC: 6 MMOL/L (ref 2–12)
AST SERPL-CCNC: 133 U/L (ref 15–37)
BILIRUB SERPL-MCNC: 4.2 MG/DL (ref 0.2–1)
BUN SERPL-MCNC: 5 MG/DL (ref 6–20)
BUN/CREAT SERPL: 6 (ref 12–20)
CALCIUM SERPL-MCNC: 8.8 MG/DL (ref 8.5–10.1)
CERULOPLASMIN SERPL-MCNC: 40.1 MG/DL (ref 19–39)
CHLORIDE SERPL-SCNC: 100 MMOL/L (ref 97–108)
CO2 SERPL-SCNC: 27 MMOL/L (ref 21–32)
CREAT SERPL-MCNC: 0.84 MG/DL (ref 0.55–1.02)
ERYTHROCYTE [DISTWIDTH] IN BLOOD BY AUTOMATED COUNT: 12.9 % (ref 11.5–14.5)
GLOBULIN SER CALC-MCNC: 4.6 G/DL (ref 2–4)
GLUCOSE SERPL-MCNC: 125 MG/DL (ref 65–100)
HCT VFR BLD AUTO: 37.1 % (ref 35–47)
HGB BLD-MCNC: 12.4 G/DL (ref 11.5–16)
MCH RBC QN AUTO: 30.2 PG (ref 26–34)
MCHC RBC AUTO-ENTMCNC: 33.4 G/DL (ref 30–36.5)
MCV RBC AUTO: 90.5 FL (ref 80–99)
NRBC # BLD: 0 K/UL (ref 0–0.01)
NRBC BLD-RTO: 0 PER 100 WBC
PLATELET # BLD AUTO: 236 K/UL (ref 150–400)
PMV BLD AUTO: 10.2 FL (ref 8.9–12.9)
POTASSIUM SERPL-SCNC: 3.6 MMOL/L (ref 3.5–5.1)
PROT SERPL-MCNC: 7.7 G/DL (ref 6.4–8.2)
RBC # BLD AUTO: 4.1 M/UL (ref 3.8–5.2)
SODIUM SERPL-SCNC: 133 MMOL/L (ref 136–145)
WBC # BLD AUTO: 8.9 K/UL (ref 3.6–11)

## 2025-04-18 PROCEDURE — 6370000000 HC RX 637 (ALT 250 FOR IP): Performed by: STUDENT IN AN ORGANIZED HEALTH CARE EDUCATION/TRAINING PROGRAM

## 2025-04-18 PROCEDURE — 6370000000 HC RX 637 (ALT 250 FOR IP)

## 2025-04-18 PROCEDURE — 6360000002 HC RX W HCPCS: Performed by: STUDENT IN AN ORGANIZED HEALTH CARE EDUCATION/TRAINING PROGRAM

## 2025-04-18 PROCEDURE — 85027 COMPLETE CBC AUTOMATED: CPT

## 2025-04-18 PROCEDURE — 2500000003 HC RX 250 WO HCPCS: Performed by: STUDENT IN AN ORGANIZED HEALTH CARE EDUCATION/TRAINING PROGRAM

## 2025-04-18 PROCEDURE — 80053 COMPREHEN METABOLIC PANEL: CPT

## 2025-04-18 RX ORDER — OXYCODONE HCL 5 MG/5 ML
5 SOLUTION, ORAL ORAL EVERY 4 HOURS PRN
Qty: 90 ML | Refills: 0 | Status: SHIPPED | OUTPATIENT
Start: 2025-04-18 | End: 2025-04-21

## 2025-04-18 RX ORDER — LACTOBACILLUS RHAMNOSUS GG 10B CELL
1 CAPSULE ORAL
Status: DISCONTINUED | OUTPATIENT
Start: 2025-04-19 | End: 2025-04-18 | Stop reason: HOSPADM

## 2025-04-18 RX ORDER — LIDOCAINE HYDROCHLORIDE 20 MG/ML
15 SOLUTION OROPHARYNGEAL EVERY 4 HOURS PRN
Qty: 200 ML | Refills: 0 | Status: SHIPPED | OUTPATIENT
Start: 2025-04-18

## 2025-04-18 RX ORDER — LACTOBACILLUS RHAMNOSUS GG 10B CELL
1 CAPSULE ORAL
Qty: 14 CAPSULE | Refills: 0 | Status: SHIPPED | OUTPATIENT
Start: 2025-04-19 | End: 2025-05-03

## 2025-04-18 RX ORDER — CLINDAMYCIN HYDROCHLORIDE 150 MG/1
450 CAPSULE ORAL 3 TIMES DAILY
Qty: 90 CAPSULE | Refills: 0 | Status: SHIPPED | OUTPATIENT
Start: 2025-04-18 | End: 2025-04-28

## 2025-04-18 RX ADMIN — CLINDAMYCIN PHOSPHATE 900 MG: 900 INJECTION, SOLUTION INTRAVENOUS at 01:47

## 2025-04-18 RX ADMIN — OXYCODONE HYDROCHLORIDE 5 MG: 5 SOLUTION ORAL at 01:46

## 2025-04-18 RX ADMIN — SENNOSIDES 8.6 MG: 8.6 TABLET, FILM COATED ORAL at 09:57

## 2025-04-18 RX ADMIN — SODIUM CHLORIDE, PRESERVATIVE FREE 10 ML: 5 INJECTION INTRAVENOUS at 09:58

## 2025-04-18 RX ADMIN — POLYETHYLENE GLYCOL 3350 17 G: 17 POWDER, FOR SOLUTION ORAL at 09:58

## 2025-04-18 RX ADMIN — CLINDAMYCIN PHOSPHATE 900 MG: 900 INJECTION, SOLUTION INTRAVENOUS at 10:01

## 2025-04-18 RX ADMIN — ESCITALOPRAM OXALATE 5 MG: 10 TABLET ORAL at 09:57

## 2025-04-18 RX ADMIN — OXYCODONE HYDROCHLORIDE 5 MG: 5 SOLUTION ORAL at 08:06

## 2025-04-18 RX ADMIN — BISACODYL 5 MG: 5 TABLET, COATED ORAL at 09:57

## 2025-04-18 RX ADMIN — CETIRIZINE HYDROCHLORIDE 10 MG: 10 TABLET, FILM COATED ORAL at 09:57

## 2025-04-18 RX ADMIN — LIDOCAINE HYDROCHLORIDE 15 ML: 20 SOLUTION ORAL at 08:06

## 2025-04-18 ASSESSMENT — PAIN DESCRIPTION - LOCATION
LOCATION: THROAT
LOCATION: THROAT

## 2025-04-18 ASSESSMENT — PAIN SCALES - GENERAL
PAINLEVEL_OUTOF10: 7
PAINLEVEL_OUTOF10: 5

## 2025-04-18 ASSESSMENT — PAIN DESCRIPTION - DESCRIPTORS
DESCRIPTORS: ACHING
DESCRIPTORS: SORE;TENDER

## 2025-04-18 NOTE — PLAN OF CARE
Problem: Safety - Adult  Goal: Free from fall injury  4/18/2025 1125 by Heena Sanchez RN  Outcome: Progressing  4/17/2025 2326 by Sherif Segura RN  Outcome: Progressing     Problem: Skin/Tissue Integrity - Adult  Goal: Skin integrity remains intact  4/18/2025 1125 by Heena Sanchez RN  Outcome: Progressing  4/17/2025 2326 by Sherif Segura RN  Outcome: Progressing  Goal: Incisions, wounds, or drain sites healing without S/S of infection  4/18/2025 1125 by Heena Sanchez RN  Outcome: Progressing  4/17/2025 2326 by Sherif Segura RN  Outcome: Progressing  Goal: Oral mucous membranes remain intact  4/18/2025 1125 by Heena Sanchez RN  Outcome: Progressing  4/17/2025 2326 by Sherif Segura RN  Outcome: Progressing     Problem: Gastrointestinal - Adult  Goal: Minimal or absence of nausea and vomiting  4/18/2025 1125 by Heena Sanchez RN  Outcome: Progressing  4/17/2025 2326 by Sherif Segura RN  Outcome: Progressing  Goal: Maintains or returns to baseline bowel function  4/18/2025 1125 by Heena Sanchez RN  Outcome: Progressing  4/17/2025 2326 by Sherif Segura RN  Outcome: Progressing  Goal: Maintains adequate nutritional intake  4/18/2025 1125 by Heena Sanchez RN  Outcome: Progressing  4/17/2025 2326 by Sherif Segura RN  Outcome: Progressing  Goal: Establish and maintain optimal ostomy function  4/18/2025 1125 by Heena Sanchez RN  Outcome: Progressing  4/17/2025 2326 by Sherif Segura RN  Outcome: Progressing     Problem: Discharge Planning  Goal: Discharge to home or other facility with appropriate resources  4/18/2025 1125 by Heena Sanchez RN  Outcome: Progressing  4/17/2025 2326 by Sherif Segura RN  Outcome: Progressing     Problem: Pain  Goal: Verbalizes/displays adequate comfort level or baseline comfort level  4/18/2025 1125 by Heena Sanchez RN  Outcome: Progressing  4/17/2025/2025 2326 by Sherif Segura, RN  Outcome: Progressing

## 2025-04-18 NOTE — PROGRESS NOTES
[facility]   [address] [address]       GI PROGRESS NOTE  Jaye Harrison PA-C  [phone] office  NP/PA in-hospital M-F until 4:30PM  After 5PM or on weekends, please call  for physician on call      NAME: Chantelle Fernando   :  2005   MRN:  [id number]       Subjective:     Patient resting in bed, feeling tired this morning. Discussed additional lab results including positive EBV and MRCP results showing hepatosplenomegaly.     Objective:     VITALS:   Last 24hrs VS reviewed since prior progress note. Most recent are:  Vitals:    25 0815   BP: 113/72   Pulse: 96   Resp: 16   Temp: 98 °F (36.7 °C)   SpO2: 98%       PHYSICAL EXAM:  General: Cooperative, no acute distress    Neurologic:  Alert and oriented X 3.  HEENT: EOMI, no scleral icterus   Lungs:  CTA bilaterally. No wheezing  Heart:  S1 S2, regular rhythm  Abdomen: Soft, non-distended, mild epigastric tenderness. +Bowel sounds  Extremities: No edema  Psych:   Good insight. Not anxious or agitated.    Lab Data Reviewed:     Recent Results (from the past 24 hours)   Comprehensive Metabolic Panel    Collection Time: 25  1:33 AM   Result Value Ref Range    Sodium 133 (L) 136 - 145 mmol/L    Potassium 3.6 3.5 - 5.1 mmol/L    Chloride 100 97 - 108 mmol/L    CO2 27 21 - 32 mmol/L    Anion Gap 6 2 - 12 mmol/L    Glucose 125 (H) 65 - 100 mg/dL    BUN 5 (L) 6 - 20 MG/DL    Creatinine 0.84 0.55 - 1.02 MG/DL    BUN/Creatinine Ratio 6 (L) 12 - 20      Est, Glom Filt Rate >90 >60 ml/min/1.73m2    Calcium 8.8 8.5 - 10.1 MG/DL    Total Bilirubin 4.2 (H) 0.2 - 1.0 MG/DL     (H) 12 - 78 U/L     (H) 15 - 37 U/L    Alk Phosphatase 115 45 - 117 U/L    Total Protein 7.7 6.4 - 8.2 g/dL    Albumin 3.1 (L) 3.5 - 5.0 g/dL    Globulin 4.6 (H) 2.0 - 4.0 g/dL    Albumin/Globulin Ratio 0.7 (L) 1.1 - 2.2     CBC    Collection Time: 25  1:33 AM   Result Value Ref Range    WBC 8.9 3.6 - 11.0 K/uL    RBC 4.10

## 2025-04-18 NOTE — DISCHARGE INSTRUCTIONS
To whom it may concern:    Chantelle Fernando was hospitalized at [facility] in [place], [place] from 4/14/2025 until 4/18/2025. Please excuse her absence. She may return to work but only with light duty; she needs to avoid heavy lifting until at least 5/12/25.    Thank you.    Sincerely,  Brittany Lazo MD  Adult Hospitalist  [facility]  [phone]

## 2025-04-18 NOTE — DISCHARGE SUMMARY
Physician Discharge Summary     Patient ID:    hCantelle Fernando  [id number]  19 y.o.  2005    Admit date: 4/14/2025    Discharge date and time: 4/18/2025 12:42 PM    Discharge condition: Stable    Admission HPI:  Chantelle Fernando is a 19 y.o. female with history of generalized anxiety disorder, oral contraceptive use who presented to [facility] ED on 4/14/2025 with concerns of painful and swollen left tonsil as well as noticing yellow discoloration of her and dark-colored urine. She reported near 1.5-week of progressive sore throat and left cervical lymphadenopathy.  She has had some progressive odynophagia. No recent travel and no known sick contacts. Currently works as a .  Denies IV drug use history.     The patient denied any fever, chills, chest or abdominal pain, nausea, vomiting, cough, congestion, recent illness, palpitations, or dysuria.  Remarkable vitals on ER Presentation: vss  Labs Remarkable for: T. bili 5.2, , .  UA: Cloudy, trace ketones, trace blood, nitrites, leukocyte esterase  ER Images: CT neck and soft tissue: Left tonsillar/peritonsillar abscess.  Enlarged left cervical and submandibular lymph nodes without central necrosis or separation.  Paranasal sinus inflammatory disease.  CT abdomen pelvis, RUQ US: No acute process  ER Rx: Unasyn, Toradol 30 mg      Hospital Diagnoses and Treatment Rendered:   Transaminitis (Improving)  Hyperbilirubinemia (Improving)  Hyperlipidemia  Cholestatic jaundice  -Presentation following sequelae of recent viral prodrome and acute tonsillitis raises concerns for possible viral etiology  -CT abdomen and pelvis and right upper quadrant ultrasound show no evidence of obstruction  -GI evaluated: Diet as tolerated, continue trending LFTs, bowel regimen, MRCP ordered  -ESR WNR, CRP 0.85  -TSH WNR  -RVP neg  -Hepatitis panel neg   -Anti-RNP elevated 5.1. VANNESA needed in outpatient setting  -HHV-6 negative  -positive

## 2025-04-19 LAB
EBV DNA SPEC QL NAA+PROBE: POSITIVE
SPECIMEN SOURCE: ABNORMAL

## 2025-04-20 LAB
BACTERIA SPEC CULT: NORMAL
BACTERIA SPEC CULT: NORMAL
SERVICE CMNT-IMP: NORMAL
SERVICE CMNT-IMP: NORMAL

## 2025-04-21 ENCOUNTER — TELEPHONE (OUTPATIENT)
Dept: PRIMARY CARE CLINIC | Facility: CLINIC | Age: 20
End: 2025-04-21

## 2025-04-21 NOTE — TELEPHONE ENCOUNTER
Care Transitions Initial Follow Up Call    Outreach made within 2 business days of discharge: Yes    Patient: Chantelle Fernando Patient : 2005   MRN: [id number]  Reason for Admission:   Discharge Date: 25       Spoke with: parent    Discharge department/facility: [facility]    Scheduled appointment with PCP within 7-14 days    Follow Up  Future Appointments   Date Time Provider Department Center   2025 10:00 AM [facility]   2025 10:45 AM Genet Bermudez MD [facility]       Alla Beatty

## 2025-04-22 ENCOUNTER — OFFICE VISIT (OUTPATIENT)
Dept: PRIMARY CARE CLINIC | Facility: CLINIC | Age: 20
End: 2025-04-22

## 2025-04-22 VITALS
WEIGHT: 129.8 LBS | DIASTOLIC BLOOD PRESSURE: 76 MMHG | BODY MASS INDEX: 23 KG/M2 | HEIGHT: 63 IN | OXYGEN SATURATION: 99 % | TEMPERATURE: 97.8 F | SYSTOLIC BLOOD PRESSURE: 102 MMHG | HEART RATE: 110 BPM

## 2025-04-22 DIAGNOSIS — Z09 HOSPITAL DISCHARGE FOLLOW-UP: Primary | ICD-10-CM

## 2025-04-22 DIAGNOSIS — B27.90 EPSTEIN BARR INFECTION: ICD-10-CM

## 2025-04-22 DIAGNOSIS — R74.8 ELEVATED LIVER ENZYMES: ICD-10-CM

## 2025-04-22 RX ORDER — HYDROXYZINE HYDROCHLORIDE 25 MG/1
25 TABLET, FILM COATED ORAL NIGHTLY
Qty: 30 TABLET | Refills: 0 | Status: SHIPPED | OUTPATIENT
Start: 2025-04-22 | End: 2025-05-22

## 2025-04-22 NOTE — PROGRESS NOTES
Post-Discharge Transitional Care  Follow Up      Chantelle Fernando   YOB: 2005    Date of Office Visit:  4/22/2025  Date of Hospital Admission: 4/14/25  Date of Hospital Discharge: 4/18/25  Risk of hospital readmission (high >=14%. Medium >=10%) :Readmission Risk Score: 7.6      Care management risk score Rising risk (score 2-5) and Complex Care (Scores >=6): No Risk Score On File     Non face to face  following discharge, date last encounter closed (first attempt may have been earlier): 04/21/2025    Call initiated 2 business days of discharge: Yes    ASSESSMENT/PLAN:   Hospital discharge follow-up  Comments:  She is feeling better since discharge. Plans are to follow up with ENT out patient. SHe will complete antibiotics. She notes some itching since starting the abx, but denies trouble swallowing or breathing. Offered to switch abx, she would like to continue for now but if it gets worse let us know. Also aware to go to ED with trouble swallowing or breathing. Will continue to monitor LFTs.   Orders:  -     [facility] DISCHARGE [facility] RECONCILED W/ CURRENT OUTPATIENT MED LIST  Ban Hernandez infection  Comments:  We discussed given enlarged spleen to avoid strenous activity for at least 4-6 weeks  Elevated liver enzymes  -     Comprehensive Metabolic Panel    Medical Decision Making: moderate complexity  No follow-ups on file.    On this date 4/22/2025 I have spent 40 minutes reviewing previous notes, test results and face to face with the patient discussing the diagnosis and importance of compliance with the treatment plan as well as documenting on the day of the visit.       Subjective:   HPI:  Follow up of Hospital problems/diagnosis(es): transamintis, hyperbilirubinemia, HLD, cholestatic jaundice    Inpatient course: Discharge summary reviewed- see chart.    Interval history/Current status: patient went to ED on 4/14/25 for worsening left tonsillar pain and \"yellow\" to eyes and skin.  On admission, she had

## 2025-04-22 NOTE — PROGRESS NOTES
\"Have you been to the ER, urgent care clinic since your last visit?  Hospitalized since your last visit?\"    YES - When: approximately 04/14/2025 ago.  Where and Why: [facility] because of ent doc.    “Have you seen or consulted any other health care providers outside our system since your last visit?”    NO     “Have you had a pap smear?”    Yes 02/2025    No cervical cancer screening on file

## 2025-04-23 ENCOUNTER — RESULTS FOLLOW-UP (OUTPATIENT)
Dept: PRIMARY CARE CLINIC | Facility: CLINIC | Age: 20
End: 2025-04-23

## 2025-04-23 LAB
ALBUMIN SERPL-MCNC: 4.1 G/DL (ref 4–5)
ALP SERPL-CCNC: 125 IU/L (ref 44–121)
ALT SERPL-CCNC: 219 IU/L (ref 0–32)
AST SERPL-CCNC: 87 IU/L (ref 0–40)
BILIRUB SERPL-MCNC: 1.9 MG/DL (ref 0–1.2)
BUN SERPL-MCNC: 10 MG/DL (ref 6–20)
BUN/CREAT SERPL: 13 (ref 9–23)
CALCIUM SERPL-MCNC: 9.7 MG/DL (ref 8.7–10.2)
CHLORIDE SERPL-SCNC: 100 MMOL/L (ref 96–106)
CO2 SERPL-SCNC: 24 MMOL/L (ref 20–29)
CREAT SERPL-MCNC: 0.75 MG/DL (ref 0.57–1)
EGFRCR SERPLBLD CKD-EPI 2021: 116 ML/MIN/1.73
GLOBULIN SER CALC-MCNC: 4.2 G/DL (ref 1.5–4.5)
GLUCOSE SERPL-MCNC: 90 MG/DL (ref 70–99)
POTASSIUM SERPL-SCNC: 4.8 MMOL/L (ref 3.5–5.2)
PROT SERPL-MCNC: 8.3 G/DL (ref 6–8.5)
SODIUM SERPL-SCNC: 136 MMOL/L (ref 134–144)

## 2025-04-24 LAB
Lab: NORMAL
Lab: NORMAL
REFERENCE LAB: NORMAL

## 2025-05-06 ENCOUNTER — TRANSCRIBE ORDERS (OUTPATIENT)
Facility: HOSPITAL | Age: 20
End: 2025-05-06

## 2025-05-06 DIAGNOSIS — R22.1 LOCALIZED SWELLING, MASS AND LUMP, NECK: Primary | ICD-10-CM

## 2025-05-14 RX ORDER — HYDROXYZINE HYDROCHLORIDE 25 MG/1
25 TABLET, FILM COATED ORAL NIGHTLY
Qty: 90 TABLET | Refills: 1 | Status: SHIPPED | OUTPATIENT
Start: 2025-05-14

## 2025-05-31 LAB
BUN SERPL-MCNC: 7 MG/DL (ref 6–20)
BUN/CREAT SERPL: 9 (ref 9–23)
CALCIUM SERPL-MCNC: 9.9 MG/DL (ref 8.7–10.2)
CHLORIDE SERPL-SCNC: 102 MMOL/L (ref 96–106)
CO2 SERPL-SCNC: 19 MMOL/L (ref 20–29)
CREAT SERPL-MCNC: 0.76 MG/DL (ref 0.57–1)
EGFRCR SERPLBLD CKD-EPI 2021: 114 ML/MIN/1.73
GLUCOSE SERPL-MCNC: 78 MG/DL (ref 70–99)
POTASSIUM SERPL-SCNC: 3.9 MMOL/L (ref 3.5–5.2)
SODIUM SERPL-SCNC: 138 MMOL/L (ref 134–144)

## 2025-06-02 LAB — SPECIMEN STATUS REPORT: NORMAL

## 2025-06-03 LAB
ALBUMIN SERPL-MCNC: 4.5 G/DL (ref 4–5)
ALP SERPL-CCNC: 42 IU/L (ref 44–121)
ALT SERPL-CCNC: 13 IU/L (ref 0–32)
AST SERPL-CCNC: 16 IU/L (ref 0–40)
BILIRUB SERPL-MCNC: 0.3 MG/DL (ref 0–1.2)
BUN SERPL-MCNC: 7 MG/DL (ref 6–20)
BUN/CREAT SERPL: 9 (ref 9–23)
CALCIUM SERPL-MCNC: 9.8 MG/DL (ref 8.7–10.2)
CHLORIDE SERPL-SCNC: 102 MMOL/L (ref 96–106)
CO2 SERPL-SCNC: 18 MMOL/L (ref 20–29)
CREAT SERPL-MCNC: 0.77 MG/DL (ref 0.57–1)
EGFRCR SERPLBLD CKD-EPI 2021: 112 ML/MIN/1.73
GLOBULIN SER CALC-MCNC: 3.1 G/DL (ref 1.5–4.5)
GLUCOSE SERPL-MCNC: 79 MG/DL (ref 70–99)
POTASSIUM SERPL-SCNC: 3.9 MMOL/L (ref 3.5–5.2)
PROT SERPL-MCNC: 7.6 G/DL (ref 6–8.5)
SODIUM SERPL-SCNC: 139 MMOL/L (ref 134–144)

## 2025-07-10 DIAGNOSIS — R21 RASH: ICD-10-CM

## 2025-07-11 RX ORDER — TRIAMCINOLONE ACETONIDE 1 MG/G
OINTMENT TOPICAL
Qty: 80 G | Refills: 0 | Status: SHIPPED | OUTPATIENT
Start: 2025-07-11